# Patient Record
Sex: FEMALE | Race: WHITE | NOT HISPANIC OR LATINO | Employment: FULL TIME | URBAN - METROPOLITAN AREA
[De-identification: names, ages, dates, MRNs, and addresses within clinical notes are randomized per-mention and may not be internally consistent; named-entity substitution may affect disease eponyms.]

---

## 2017-02-16 ENCOUNTER — OFFICE VISIT (OUTPATIENT)
Dept: FAMILY MEDICINE | Facility: CLINIC | Age: 57
End: 2017-02-16
Payer: COMMERCIAL

## 2017-02-16 ENCOUNTER — TELEPHONE (OUTPATIENT)
Dept: NURSING | Facility: CLINIC | Age: 57
End: 2017-02-16

## 2017-02-16 VITALS
HEART RATE: 74 BPM | TEMPERATURE: 98 F | HEIGHT: 69 IN | WEIGHT: 140 LBS | DIASTOLIC BLOOD PRESSURE: 81 MMHG | BODY MASS INDEX: 20.73 KG/M2 | RESPIRATION RATE: 18 BRPM | SYSTOLIC BLOOD PRESSURE: 112 MMHG | OXYGEN SATURATION: 97 %

## 2017-02-16 DIAGNOSIS — J01.90 ACUTE SINUSITIS WITH SYMPTOMS > 10 DAYS: Primary | ICD-10-CM

## 2017-02-16 DIAGNOSIS — B37.31 CANDIDIASIS OF VULVA AND VAGINA: ICD-10-CM

## 2017-02-16 DIAGNOSIS — H10.32 ACUTE CONJUNCTIVITIS OF LEFT EYE: Primary | ICD-10-CM

## 2017-02-16 DIAGNOSIS — H10.32 ACUTE CONJUNCTIVITIS OF LEFT EYE: ICD-10-CM

## 2017-02-16 PROCEDURE — 99214 OFFICE O/P EST MOD 30 MIN: CPT | Performed by: FAMILY MEDICINE

## 2017-02-16 RX ORDER — FLUCONAZOLE 150 MG/1
150 TABLET ORAL
Qty: 4 TABLET | Refills: 0 | Status: SHIPPED | OUTPATIENT
Start: 2017-02-16 | End: 2022-12-19

## 2017-02-16 RX ORDER — POLYMYXIN B SULFATE AND TRIMETHOPRIM 1; 10000 MG/ML; [USP'U]/ML
1 SOLUTION OPHTHALMIC 4 TIMES DAILY
Qty: 1 BOTTLE | Refills: 0 | Status: SHIPPED | OUTPATIENT
Start: 2017-02-16 | End: 2022-12-19

## 2017-02-16 RX ORDER — POLYMYXIN B SULFATE AND TRIMETHOPRIM 1; 10000 MG/ML; [USP'U]/ML
1 SOLUTION OPHTHALMIC EVERY 4 HOURS
Qty: 1 BOTTLE | Refills: 0 | Status: SHIPPED | OUTPATIENT
Start: 2017-02-16 | End: 2017-02-16

## 2017-02-16 NOTE — PATIENT INSTRUCTIONS
Use Afrin/nasal decongestant twice a day for 3 days.      Flonase or nasacort can help as well - this you could continue for 7-14 days    Netti pot or nasal saline rinse                 Sinusitis          What is sinusitis?   Sinusitis is swollen, infected linings of the sinuses. The sinuses are hollow spaces in the bones of your face and skull. They connect with the nose through small openings. Like the nose, their linings make mucus.   How does it occur?   Sinusitis occurs when the sinus linings become infected. The passageways from the sinuses to the nose are very narrow. Swelling and mucus may block the passageways. This leads to pressure changes in the sinuses that can be painful.   A number of things can cause swelling and sinusitis. Most often it's allergens (things that cause allergies, like pollen and mold) and viruses, such as viruses that cause the common cold. Whether the cause is allergies or a virus, the sinus linings can swell. When swelling causes the sinus passageway to swell shut, bacteria, viruses, and even fungus can be trapped in the sinuses and cause a sinus infection.   If your nasal bones have been injured or are deformed, causing partial blockage of the sinus openings, you are more likely to get sinusitis.   What are the symptoms?   Symptoms include:   feeling of fullness or pressure in your head   a headache that is most painful when you first wake up in the morning or when you bend your head down or forward   pain above or below your eyes   aching in the upper jaw and teeth   runny or stuffy nose   cough, especially at night   fluid draining down the back of your throat (postnasal drainage)   sore throat in the morning or evening.   How is it diagnosed?   Your healthcare provider will ask about your symptoms and will examine you. You may have an X-ray to look for swelling, fluid, or small benign growths (polyps) in the sinuses.   How is it treated?   Decongestants may help. They may be  nonprescription or prescription. They are available as liquids, pills, and nose sprays.   Your healthcare provider may prescribe an antibiotic. In some cases you may need to take decongestants and antibiotics for several weeks.   You may need nonprescription medicine for pain, such as acetaminophen or ibuprofen. Check with your healthcare provider before you give any medicine that contains aspirin or salicylates to a child or teen. This includes medicines like baby aspirin, some cold medicines, and Pepto Bismol. Children and teens who take aspirin are at risk for a serious illness called Reye's syndrome. Ibuprofen is an NSAID. Nonsteroidal anti-inflammatory medicines (NSAIDs) may cause stomach bleeding and other problems. These risks increase with age. Read the label and take as directed. Unless recommended by your healthcare provider, do not take NSAIDs for more than 10 days for any reason.   If you have chronic or repeated sinus infections, allergies may be the cause. Your healthcare provider may prescribe antihistamine tablets or prescription nasal sprays (steroids or cromolyn) to treat the allergies.   If you have chronic, severe sinusitis that does not respond to treatment with medicines, surgery may be done. The surgeon can create an extra or enlarged passageway in the wall of the sinus cavity. This allows the sinuses to drain more easily through the nasal passages. This should help them stay free of infection.   How long will the effects last?   Symptoms may get better gradually over 3 to 10 days. Depending on what caused the sinusitis and how severe it is, it may last for days or weeks. The symptoms may come back if you do not finish all of your antibiotic.   How can I take care of myself?   Follow your healthcare provider's instructions.   If you are taking an antibiotic, take all of it as directed by your provider. If you stop taking the medicine when your symptoms are gone but before you have taken all of  the medicine, symptoms may come back.   Avoid tobacco smoke.   If you have allergies, take care to avoid the things you are allergic to, such as animal dander.   Add moisture to the air with a humidifier or a vaporizer, unless you have mold allergy (mold may grow in your vaporizer).   Inhale steam from a basin of hot water or shower to help open your sinuses and relieve pain.   Use saline nasal sprays to help wash out nasal passages and clear some mucus from the airways.   Use decongestants as directed on the label or by your provider.   If you are using a nonprescription nasal-spray decongestant, generally you should not use it for more than 3 days. After 3 days it may cause your symptoms to get worse. Ask your healthcare provider if it is OK for you to use a nasal spray decongestant longer than this.   Get plenty of rest.   Drink more fluids to keep the mucus as thin as possible so your sinuses can drain more easily.   Put warm compresses on painful areas.   Take antibiotics as prescribed. Use all of the medicine, even after you feel better. Some sinus infections require 2 to 4 weeks of antibiotic treatment.   See your healthcare provider if the pain lasts for several days or gets worse.   If the sinus areas above or below your eyes are swollen or bulging, see your healthcare provider right away. This symptom may mean that the infection is spreading. A spreading infection can affect other parts of your body--even the brain--and needs to be treated promptly.   How can I help prevent sinusitis?   Treat your colds and allergies promptly. Use decongestants as soon as you start having symptoms.   Do not smoke and stay away from secondhand smoke.   Drink lots of fluids to keep the mucus thin.   Humidify your home if the air is particularly dry.   If you have sinus infections often, consider having allergy tests.   If sinusitis continues to be a problem despite treatment, you might need an exam by an ear, nose, and  throat doctor (called an ENT or otolaryngologist). The specialist will check for polyps or a deformed bone that may be blocking your sinuses.     Published by [employer].  This content is reviewed periodically and is subject to change as new health information becomes available. The information is intended to inform and educate and is not a replacement for medical evaluation, advice, diagnosis or treatment by a healthcare professional.   Developed by [employer].   ? 2010 [employer] and/or its affiliates. All Rights Reserved.   Copyright   Clinical Reference Systems 2011  Adult Health Advisor          Thank you for choosing [facility].  You may be receiving a survey in the mail from [employer] regarding your visit today.  Please take a few minutes to complete and return the survey to let us know how we are doing.      Our Clinic hours are:  Mondays    7:20 am - 7 pm  Tues -  Fri  7:20 am - 5 pm    Clinic Phone: [phone]    The clinic lab opens at 7:30 am Mon - Fri and appointments are required.    [facility]  Ph. [phone]  Monday-Thursday 8 am - 7pm  Tues/Wed/Fri 8 am - 5:30 pm

## 2017-02-16 NOTE — NURSING NOTE
"Chief Complaint   Patient presents with     Ent Problem     Health Maintenance     agreed to mammogram, fit and pap       Initial /81 (BP Location: Right arm, Patient Position: Chair, Cuff Size: Adult Regular)  Pulse 74  Temp 98  F (36.7  C) (Tympanic)  Resp 18  Ht 5' 9\" (1.753 m)  Wt 140 lb (63.5 kg)  SpO2 97%  Breastfeeding? No  BMI 20.67 kg/m2 Estimated body mass index is 20.67 kg/(m^2) as calculated from the following:    Height as of this encounter: 5' 9\" (1.753 m).    Weight as of this encounter: 140 lb (63.5 kg).  Medication Reconciliation: complete    "

## 2017-02-16 NOTE — PROGRESS NOTES
"  SUBJECTIVE:                                                    Thais Castellon is a 56 year old female who presents to clinic today for the following health issues:      Acute Illness   Acute illness concerns: blood shot eyes, sinus  Onset: 3 weeks    Fever: no     Chills/Sweats: YES    Headache (location?): YES    Sinus Pressure:YES- tender, post-nasal drainage and facial pain    Conjunctivitis:  YES: left    Ear Pain: YES: right    Rhinorrhea: YES    Congestion: YES    Sore Throat: YES     Cough: YES-greenish/brown    Wheeze: YES    Decreased Appetite: YES    Nausea: no     Vomiting: no     Diarrhea:  no     Dysuria/Freq.: no     Fatigue/Achiness: YES    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 No  BMI 20.67 kg/m2  EXAM: GENERAL APPEARANCE: Alert, no acute distress  HENT: right TM abnormal, dull, yellow fluid behind TM, bulging, left TM normal, oral mucous membranes moist, frontal sinus tenderness bilateral, nasal membranes injected/edematous  RESP: lungs clear to auscultation   CV: normal rate, regular rhythm, no murmur or gallop  ABDOMEN: soft, no organomegaly, masses or tenderness      ASSESSMENT/PLAN:      ICD-10-CM    1. Acute sinusitis with symptoms > 10 days J01.90 amoxicillin-clavulanate (AUGMENTIN) 875-125 MG per tablet   2. Candidiasis of vulva and vagina B37.3 fluconazole (DIFLUCAN) 150 MG tablet   will treat with antibiotic given length of symptoms (2 weeks)    Patient Instructions   Use Afrin/nasal decongestant twice a day for 3 days.      Flonase or nasacort can help as well - this you could continue for 7-14 days    Netti pot or nasal saline rinse                 Sinusitis          What is sinusitis? "   Sinusitis is swollen, infected linings of the sinuses. The sinuses are hollow spaces in the bones of your face and skull. They connect with the nose through small openings. Like the nose, their linings make mucus.   How does it occur?   Sinusitis occurs when the sinus linings become infected. The passageways from the sinuses to the nose are very narrow. Swelling and mucus may block the passageways. This leads to pressure changes in the sinuses that can be painful.   A number of things can cause swelling and sinusitis. Most often it's allergens (things that cause allergies, like pollen and mold) and viruses, such as viruses that cause the common cold. Whether the cause is allergies or a virus, the sinus linings can swell. When swelling causes the sinus passageway to swell shut, bacteria, viruses, and even fungus can be trapped in the sinuses and cause a sinus infection.   If your nasal bones have been injured or are deformed, causing partial blockage of the sinus openings, you are more likely to get sinusitis.   What are the symptoms?   Symptoms include:   feeling of fullness or pressure in your head   a headache that is most painful when you first wake up in the morning or when you bend your head down or forward   pain above or below your eyes   aching in the upper jaw and teeth   runny or stuffy nose   cough, especially at night   fluid draining down the back of your throat (postnasal drainage)   sore throat in the morning or evening.   How is it diagnosed?   Your healthcare provider will ask about your symptoms and will examine you. You may have an X-ray to look for swelling, fluid, or small benign growths (polyps) in the sinuses.   How is it treated?   Decongestants may help. They may be nonprescription or prescription. They are available as liquids, pills, and nose sprays.   Your healthcare provider may prescribe an antibiotic. In some cases you may need to take decongestants and antibiotics for several weeks.    You may need nonprescription medicine for pain, such as acetaminophen or ibuprofen. Check with your healthcare provider before you give any medicine that contains aspirin or salicylates to a child or teen. This includes medicines like baby aspirin, some cold medicines, and Pepto Bismol. Children and teens who take aspirin are at risk for a serious illness called Reye's syndrome. Ibuprofen is an NSAID. Nonsteroidal anti-inflammatory medicines (NSAIDs) may cause stomach bleeding and other problems. These risks increase with age. Read the label and take as directed. Unless recommended by your healthcare provider, do not take NSAIDs for more than 10 days for any reason.   If you have chronic or repeated sinus infections, allergies may be the cause. Your healthcare provider may prescribe antihistamine tablets or prescription nasal sprays (steroids or cromolyn) to treat the allergies.   If you have chronic, severe sinusitis that does not respond to treatment with medicines, surgery may be done. The surgeon can create an extra or enlarged passageway in the wall of the sinus cavity. This allows the sinuses to drain more easily through the nasal passages. This should help them stay free of infection.   How long will the effects last?   Symptoms may get better gradually over 3 to 10 days. Depending on what caused the sinusitis and how severe it is, it may last for days or weeks. The symptoms may come back if you do not finish all of your antibiotic.   How can I take care of myself?   Follow your healthcare provider's instructions.   If you are taking an antibiotic, take all of it as directed by your provider. If you stop taking the medicine when your symptoms are gone but before you have taken all of the medicine, symptoms may come back.   Avoid tobacco smoke.   If you have allergies, take care to avoid the things you are allergic to, such as animal dander.   Add moisture to the air with a humidifier or a vaporizer, unless  you have mold allergy (mold may grow in your vaporizer).   Inhale steam from a basin of hot water or shower to help open your sinuses and relieve pain.   Use saline nasal sprays to help wash out nasal passages and clear some mucus from the airways.   Use decongestants as directed on the label or by your provider.   If you are using a nonprescription nasal-spray decongestant, generally you should not use it for more than 3 days. After 3 days it may cause your symptoms to get worse. Ask your healthcare provider if it is OK for you to use a nasal spray decongestant longer than this.   Get plenty of rest.   Drink more fluids to keep the mucus as thin as possible so your sinuses can drain more easily.   Put warm compresses on painful areas.   Take antibiotics as prescribed. Use all of the medicine, even after you feel better. Some sinus infections require 2 to 4 weeks of antibiotic treatment.   See your healthcare provider if the pain lasts for several days or gets worse.   If the sinus areas above or below your eyes are swollen or bulging, see your healthcare provider right away. This symptom may mean that the infection is spreading. A spreading infection can affect other parts of your body--even the brain--and needs to be treated promptly.   How can I help prevent sinusitis?   Treat your colds and allergies promptly. Use decongestants as soon as you start having symptoms.   Do not smoke and stay away from secondhand smoke.   Drink lots of fluids to keep the mucus thin.   Humidify your home if the air is particularly dry.   If you have sinus infections often, consider having allergy tests.   If sinusitis continues to be a problem despite treatment, you might need an exam by an ear, nose, and throat doctor (called an ENT or otolaryngologist). The specialist will check for polyps or a deformed bone that may be blocking your sinuses.     Published by [employer].  This content is reviewed periodically and is subject to  change as new health information becomes available. The information is intended to inform and educate and is not a replacement for medical evaluation, advice, diagnosis or treatment by a healthcare professional.   Developed by [employer].   ? 2010 [employer] and/or its affiliates. All Rights Reserved.   Copyright   Clinical Reference Systems 2011  Adult Health Advisor          Thank you for choosing [facility].  You may be receiving a survey in the mail from [employer] regarding your visit today.  Please take a few minutes to complete and return the survey to let us know how we are doing.      Our Clinic hours are:  Mondays    7:20 am - 7 pm  Tues -  Fri  7:20 am - 5 pm    Clinic Phone: [phone]    The clinic lab opens at 7:30 am Mon - Fri and appointments are required.    [facility]. [phone]  Monday-Thursday 8 am - 7pm  Tues/Wed/Fri 8 am - 5:30 pm

## 2017-02-16 NOTE — TELEPHONE ENCOUNTER
Transmission to pharmacy failing x 2 during e prescribing.  Verbal order provided to pharmacist at [facility] in [place], [place] at 5:50pm.    Rhonda Soliz RN  FNA

## 2017-02-16 NOTE — MR AVS SNAPSHOT
After Visit Summary   2/16/2017    Thais Castellon    MRN: [id number]           Patient Information     Date Of Birth          1960        Visit Information        Provider Department      2/16/2017 8:40 AM Karrie Infante MD [facility]        Today's Diagnoses     Acute sinusitis with symptoms > 10 days    -  1    Candidiasis of vulva and vagina          Care Instructions    Use Afrin/nasal decongestant twice a day for 3 days.      Flonase or nasacort can help as well - this you could continue for 7-14 days    Netti pot or nasal saline rinse                 Sinusitis          What is sinusitis?   Sinusitis is swollen, infected linings of the sinuses. The sinuses are hollow spaces in the bones of your face and skull. They connect with the nose through small openings. Like the nose, their linings make mucus.   How does it occur?   Sinusitis occurs when the sinus linings become infected. The passageways from the sinuses to the nose are very narrow. Swelling and mucus may block the passageways. This leads to pressure changes in the sinuses that can be painful.   A number of things can cause swelling and sinusitis. Most often it's allergens (things that cause allergies, like pollen and mold) and viruses, such as viruses that cause the common cold. Whether the cause is allergies or a virus, the sinus linings can swell. When swelling causes the sinus passageway to swell shut, bacteria, viruses, and even fungus can be trapped in the sinuses and cause a sinus infection.   If your nasal bones have been injured or are deformed, causing partial blockage of the sinus openings, you are more likely to get sinusitis.   What are the symptoms?   Symptoms include:   feeling of fullness or pressure in your head   a headache that is most painful when you first wake up in the morning or when you bend your head down or forward   pain above or below your eyes   aching in the upper jaw and teeth   runny  or stuffy nose   cough, especially at night   fluid draining down the back of your throat (postnasal drainage)   sore throat in the morning or evening.   How is it diagnosed?   Your healthcare provider will ask about your symptoms and will examine you. You may have an X-ray to look for swelling, fluid, or small benign growths (polyps) in the sinuses.   How is it treated?   Decongestants may help. They may be nonprescription or prescription. They are available as liquids, pills, and nose sprays.   Your healthcare provider may prescribe an antibiotic. In some cases you may need to take decongestants and antibiotics for several weeks.   You may need nonprescription medicine for pain, such as acetaminophen or ibuprofen. Check with your healthcare provider before you give any medicine that contains aspirin or salicylates to a child or teen. This includes medicines like baby aspirin, some cold medicines, and Pepto Bismol. Children and teens who take aspirin are at risk for a serious illness called Reye's syndrome. Ibuprofen is an NSAID. Nonsteroidal anti-inflammatory medicines (NSAIDs) may cause stomach bleeding and other problems. These risks increase with age. Read the label and take as directed. Unless recommended by your healthcare provider, do not take NSAIDs for more than 10 days for any reason.   If you have chronic or repeated sinus infections, allergies may be the cause. Your healthcare provider may prescribe antihistamine tablets or prescription nasal sprays (steroids or cromolyn) to treat the allergies.   If you have chronic, severe sinusitis that does not respond to treatment with medicines, surgery may be done. The surgeon can create an extra or enlarged passageway in the wall of the sinus cavity. This allows the sinuses to drain more easily through the nasal passages. This should help them stay free of infection.   How long will the effects last?   Symptoms may get better gradually over 3 to 10 days.  Depending on what caused the sinusitis and how severe it is, it may last for days or weeks. The symptoms may come back if you do not finish all of your antibiotic.   How can I take care of myself?   Follow your healthcare provider's instructions.   If you are taking an antibiotic, take all of it as directed by your provider. If you stop taking the medicine when your symptoms are gone but before you have taken all of the medicine, symptoms may come back.   Avoid tobacco smoke.   If you have allergies, take care to avoid the things you are allergic to, such as animal dander.   Add moisture to the air with a humidifier or a vaporizer, unless you have mold allergy (mold may grow in your vaporizer).   Inhale steam from a basin of hot water or shower to help open your sinuses and relieve pain.   Use saline nasal sprays to help wash out nasal passages and clear some mucus from the airways.   Use decongestants as directed on the label or by your provider.   If you are using a nonprescription nasal-spray decongestant, generally you should not use it for more than 3 days. After 3 days it may cause your symptoms to get worse. Ask your healthcare provider if it is OK for you to use a nasal spray decongestant longer than this.   Get plenty of rest.   Drink more fluids to keep the mucus as thin as possible so your sinuses can drain more easily.   Put warm compresses on painful areas.   Take antibiotics as prescribed. Use all of the medicine, even after you feel better. Some sinus infections require 2 to 4 weeks of antibiotic treatment.   See your healthcare provider if the pain lasts for several days or gets worse.   If the sinus areas above or below your eyes are swollen or bulging, see your healthcare provider right away. This symptom may mean that the infection is spreading. A spreading infection can affect other parts of your body--even the brain--and needs to be treated promptly.   How can I help prevent sinusitis?   Treat  your colds and allergies promptly. Use decongestants as soon as you start having symptoms.   Do not smoke and stay away from secondhand smoke.   Drink lots of fluids to keep the mucus thin.   Humidify your home if the air is particularly dry.   If you have sinus infections often, consider having allergy tests.   If sinusitis continues to be a problem despite treatment, you might need an exam by an ear, nose, and throat doctor (called an ENT or otolaryngologist). The specialist will check for polyps or a deformed bone that may be blocking your sinuses.     Published by [employer].  This content is reviewed periodically and is subject to change as new health information becomes available. The information is intended to inform and educate and is not a replacement for medical evaluation, advice, diagnosis or treatment by a healthcare professional.   Developed by [employer].   ? 2010 [employer] and/or its affiliates. All Rights Reserved.   Copyright   [employer] 2011  Adult Health Advisor          Thank you for choosing [facility].  You may be receiving a survey in the mail from [email or web address] regarding your visit today.  Please take a few minutes to complete and return the survey to let us know how we are doing.      Our Clinic hours are:  Mondays    7:20 am - 7 pm  Tues -  Fri  7:20 am - 5 pm    Clinic Phone: [phone]    The clinic lab opens at 7:30 am Mon - Fri and appointments are required.    [facility]  Ph. [phone]  Monday-Thursday 8 am - 7pm  Tues/Wed/Fri 8 am - 5:30 pm               Follow-ups after your visit        Who to contact     If you have questions or need follow up information about today's clinic visit or your schedule please contact [facility] directly at [phone].  Normal or non-critical lab and imaging results will be communicated to you by MyChart, letter or phone within 4 business days after the clinic has received the  "results. If you do not hear from us within 7 days, please contact the clinic through [employer] or phone. If you have a critical or abnormal lab result, we will notify you by phone as soon as possible.  Submit refill requests through [employer] or call your pharmacy and they will forward the refill request to us. Please allow 3 business days for your refill to be completed.          Additional Information About Your Visit        [employer] Information     [employer] lets you send messages to your doctor, view your test results, renew your prescriptions, schedule appointments and more. To sign up, go to [email or web address] . Click on \"Log in\" on the left side of the screen, which will take you to the Welcome page. Then click on \"Sign up Now\" on the right side of the page.     You will be asked to enter the access code listed below, as well as some personal information. Please follow the directions to create your username and password.     Your access code is: [email or web address]  Expires: 3/16/2017  8:31 AM     Your access code will  in 90 days. If you need help or a new code, please call your [place] clinic or [phone].        Care EveryWhere ID     This is your Care EveryWhere ID. This could be used by other organizations to access your [place] medical records  [id number]        Your Vitals Were     Pulse Temperature Respirations Height Pulse Oximetry Breastfeeding?    74 98  F (36.7  C) (Tympanic) 18 5' 9\" (1.753 m) 97% No    BMI (Body Mass Index)                   20.67 kg/m2            Blood Pressure from Last 3 Encounters:   17 112/81   16 139/89   16 112/80    Weight from Last 3 Encounters:   17 140 lb (63.5 kg)   16 144 lb (65.3 kg)   16 138 lb (62.6 kg)              Today, you had the following     No orders found for display         Today's Medication Changes          These changes are accurate as of: 17  9:00 AM.  If you have any questions, ask your nurse or doctor. "               Start taking these medicines.        Dose/Directions    amoxicillin-clavulanate 875-125 MG per tablet   Commonly known as:  AUGMENTIN   Used for:  Acute sinusitis with symptoms > 10 days   Started by:  Karrie Infante MD        Dose:  1 tablet   Take 1 tablet by mouth 2 times daily   Quantity:  28 tablet   Refills:  0       fluconazole 150 MG tablet   Commonly known as:  DIFLUCAN   Used for:  Candidiasis of vulva and vagina   Started by:  Karrie Infante MD        Dose:  150 mg   Take 1 tablet (150 mg) by mouth every 3 days   Quantity:  4 tablet   Refills:  0         Stop taking these medicines if you haven't already. Please contact your care team if you have questions.     azithromycin 250 MG tablet   Commonly known as:  ZITHROMAX   Stopped by:  Karrie Infante MD                Where to get your medicines      These medications were sent to [facility] – [place], [place] - [address]  [address] [facility] [address]     Phone:  [phone]     amoxicillin-clavulanate 875-125 MG per tablet    fluconazole 150 MG tablet                Primary Care Provider Office Phone # Fax #    Nupur Montalvo [phone]       [address] [address]        Thank you!     Thank you for choosing [facility]  for your care. Our goal is always to provide you with excellent care. Hearing back from our patients is one way we can continue to improve our services. Please take a few minutes to complete the written survey that you may receive in the mail after your visit with us. Thank you!             Your Updated Medication List - Protect others around you: Learn how to safely use, store and throw away your medicines at www.disposemymeds.org.          This list is accurate as of: 2/16/17  9:00 AM.  Always use your most recent med list.                   Brand Name Dispense Instructions for use     amoxicillin-clavulanate 875-125 MG per tablet    AUGMENTIN    28 tablet    Take 1 tablet by mouth 2 times daily       Desoximetasone 0.05 % Crea     100 g    Apply to affected area once or twice daily for 1-2 weeks, then only apply as needed       fluconazole 150 MG tablet    DIFLUCAN    4 tablet    Take 1 tablet (150 mg) by mouth every 3 days       Multi-vitamin Tabs tablet   Generic drug:  multivitamin, therapeutic with minerals      Take 1 tablet by mouth daily.

## 2017-02-16 NOTE — TELEPHONE ENCOUNTER
RN Conjunctivitis Protocol: Ages 2 and older  Thais Castellon is a 56 year old female is having symptoms reviewed for possible conjunctivitis.      ASSESSMENT/PLAN:  Allergy to Sulfa?  No   1.  Medication Indicated: YES - POLYTRIM 22436-9.1 UNIT/ML-% OP Sol, 1 drop in affected eye(s) 4 times daily while awake x 7 days. .    2.  Education regarding contact precautions, hand washing, avoid wearing contacts until finished with drops or until symptoms resolve, contact clinic if there is no improvement of symptoms within 3 days and if develops eye pain or sensitivity to light.   3.  Follow-up: Contact provider's triage RN if symptoms do not improve after 3 days of antibiotic treatment or if symptoms return after antibiotic therapy is complete.  4.  Patient verbalized understanding of this plan and is agreeable.    SUBJECTIVE:     Conjunctival symptoms: redness, mattering (yellow/green), burning and itching   Location: left eye  Onset: 1 day ago  In addition notes: None  Contact Lens use?: No  Complicating factors    Reports:Eyelid symptoms swelling   Denies:NONE     OBJECTIVE:     Encounter handled by: Nurse Triage.     Sarah Dean RN

## 2017-03-16 ENCOUNTER — TELEPHONE (OUTPATIENT)
Dept: FAMILY MEDICINE | Facility: CLINIC | Age: 57
End: 2017-03-16

## 2017-03-16 NOTE — TELEPHONE ENCOUNTER
Panel Management Review      Patient has the following on her problem list: None      Composite cancer screening  Chart review shows that this patient is due/due soon for the following Mammogram and Fecal Colorectal (FIT)  Summary:    Patient is due/failing the following:   FIT and MAMMOGRAM    Action needed:   Patient needs referral/order: fit and mammogram    Type of outreach:    Phone, spoke to patient.  left detailed message on personal cell re: Hm screening [facility]    Questions for provider review:    None                                                                                                                                    Eva Cordon MA       Chart routed to Care Team .

## 2017-04-12 ENCOUNTER — TELEPHONE (OUTPATIENT)
Dept: FAMILY MEDICINE | Facility: CLINIC | Age: 57
End: 2017-04-12

## 2017-05-07 PROCEDURE — 82274 ASSAY TEST FOR BLOOD FECAL: CPT | Performed by: NURSE PRACTITIONER

## 2017-05-10 DIAGNOSIS — Z12.11 COLON CANCER SCREENING: ICD-10-CM

## 2017-05-11 LAB — HEMOCCULT STL QL IA: NEGATIVE

## 2017-05-18 ENCOUNTER — TELEPHONE (OUTPATIENT)
Dept: FAMILY MEDICINE | Facility: CLINIC | Age: 57
End: 2017-05-18

## 2017-05-18 NOTE — TELEPHONE ENCOUNTER
Panel Management Review      Patient has the following on her problem list: None      Composite cancer screening  Chart review shows that this patient is due/due soon for the following Mammogram  Summary:    Patient is due/failing the following:   MAMMOGRAM    Action needed:   Pt to schedule mammogram    Type of outreach:    Sent letter.    Questions for provider review:    None                                                                                                                                    Reema Infante MA

## 2017-05-18 NOTE — LETTER
[facility]  [address]  [facility] [address]  Phone: [phone]    May 18, 2017    Thais Castellon  [address] [facility] [address]              Dear Ms. Castellon,       This is a reminder that it is time to make your appointment for your annual mammogram.  You may make an appointment for your Digital Mammogram by calling our scheduling line at [phone] to schedule at one of our locations.    If you are experiencing breast symptoms or concerns such as a new lump or breast pain you should first contact your health care provider before scheduling your mammogram. Your physician may want to see you prior to your visit.    We would like to take this opportunity to remind you that along with an annual mammogram, the American Cancer Society recommends an annual breast examination by your physician or health care provider.    If you are a patient currently enrolled in the [place] [facility]OL Program or the [facility] Program, you must be seen by your provider for a clinical breast examination prior to your mammogram.    Please disregard this notice if you have already scheduled an appointment. Or, if you have had this done elsewhere, please have your records sent to the clinic.     Thank you and we look forward to seeing you in the near future for your annual screening mammogram.          Sincerely,      Karrie Infante MD/ la

## 2017-06-26 ENCOUNTER — HOSPITAL ENCOUNTER (OUTPATIENT)
Dept: MAMMOGRAPHY | Facility: CLINIC | Age: 57
Discharge: HOME | End: 2017-06-26
Attending: NURSE PRACTITIONER | Admitting: NURSE PRACTITIONER
Payer: COMMERCIAL

## 2017-06-26 DIAGNOSIS — Z12.31 ENCOUNTER FOR SCREENING MAMMOGRAM FOR BREAST CANCER: ICD-10-CM

## 2017-06-26 PROCEDURE — G0202 SCR MAMMO BI INCL CAD: HCPCS

## 2017-06-26 PROCEDURE — 77063 BREAST TOMOSYNTHESIS BI: CPT

## 2018-01-08 ENCOUNTER — HOSPITAL ENCOUNTER (OUTPATIENT)
Dept: MRI IMAGING | Facility: CLINIC | Age: 58
Discharge: HOME | End: 2018-01-08
Attending: PHYSICIAN ASSISTANT | Admitting: PHYSICIAN ASSISTANT
Payer: COMMERCIAL

## 2018-01-08 DIAGNOSIS — S99.922S FOOT INJURY, LEFT, SEQUELA: ICD-10-CM

## 2018-01-08 PROCEDURE — 73718 MRI LOWER EXTREMITY W/O DYE: CPT | Mod: LT

## 2018-01-11 ENCOUNTER — TRANSFERRED RECORDS (OUTPATIENT)
Dept: HEALTH INFORMATION MANAGEMENT | Facility: CLINIC | Age: 58
End: 2018-01-11

## 2018-05-07 ENCOUNTER — TELEPHONE (OUTPATIENT)
Dept: FAMILY MEDICINE | Facility: CLINIC | Age: 58
End: 2018-05-07

## 2018-05-07 DIAGNOSIS — B37.31 YEAST INFECTION OF THE VAGINA: Primary | ICD-10-CM

## 2018-05-07 RX ORDER — FLUCONAZOLE 150 MG/1
150 TABLET ORAL ONCE
Qty: 1 TABLET | Refills: 0 | Status: SHIPPED | OUTPATIENT
Start: 2018-05-07 | End: 2018-05-07

## 2018-05-07 NOTE — TELEPHONE ENCOUNTER
Reason for call:  Patient reporting a symptom    Symptom or request: Pt is calling to ask Dr. Infante for an Rx for a vaginal yeast infection med.  She recently had oral surgery on an infected tooth and was prescribed Amox, by the dentist, for 10 days and now has a yeast infection.  [facility]    Duration (how long have symptoms been present): today    Have you been treated for this before? Yes    Additional comments:     Phone Number patient can be reached at:  Cell number on file:    Telephone Information:   Mobile [phone]       Best Time:  any    Can we leave a detailed message on this number:  YES    Call taken on 5/7/2018 at 9:11 AM by Viridiana Monreal

## 2018-10-24 ENCOUNTER — TRANSFERRED RECORDS (OUTPATIENT)
Dept: HEALTH INFORMATION MANAGEMENT | Facility: CLINIC | Age: 58
End: 2018-10-24

## 2019-02-04 ENCOUNTER — VIRTUAL VISIT (OUTPATIENT)
Dept: FAMILY MEDICINE | Facility: OTHER | Age: 59
End: 2019-02-04

## 2019-02-04 NOTE — PROGRESS NOTES
"Date:   Clinician: Joseph Juarez  Clinician NPI: [id number]  Patient: Thais Castellon  Patient : 1960  Patient Address: [address] [id number]  Patient Phone: [phone]  Visit Protocol: URI  Patient Summary:  Thais is a 58 year old ( : 1960 ) female who initiated a Visit for cold, sinus infection, or influenza. When asked the question \"Please sign me up to receive news, health information and promotions from [employer].\", Thais responded \"No\".    Thais states her symptoms started suddenly 2-3 weeks ago.   Her symptoms consist of myalgia, enlarged lymph nodes, a headache, a sore throat, malaise, facial pain or pressure, a cough, chills, rhinitis, and nasal congestion. She is experiencing difficulty breathing due to nasal congestion but she is not short of breath.   Symptom details     Nasal secretions: The color of her mucus is yellow and blood-tinged.    Cough: Thais coughs a few times an hour and her cough is more bothersome at night. Phlegm comes into her throat when she coughs. She believes the phlegm causes the cough. The color of the phlegm is yellow and green.     Sore throat: Thais reports having moderate throat pain (4-6 on a 10 point pain scale), does not have exudate on her tonsils, and can swallow liquids. The lymph nodes in her neck are enlarged. A rash has not appeared on the skin since the sore throat started.     Facial pain or pressure: The facial pain or pressure feels worse when bending over or leaning forward.     Headache: She states the headache is mild (1-3 on a 10 point pain scale).      Thais denies having ear pain, fever, wheezing, and teeth pain. She also denies taking antibiotic medication for the symptoms, having recent facial or sinus surgery in the past 60 days, and double sickening (worsening symptoms after initial improvement).   Thais is not sure if she has been exposed to someone with strep throat. She has not recently been exposed to someone " with influenza. Thais has been in close contact with the following high risk individuals: adults 65 or older and people with asthma, heart disease or diabetes.   Thais had 1 sinus infection within the past year.   Weight: 140 lbs   Thais does not smoke or use smokeless tobacco.   MEDICATIONS: No current medications, ALLERGIES: NKDA  Clinician Response:  Dear Thais,  Based on the information provided, you have acute bacterial sinusitis, also known as a sinus infection. Sinus infections are caused by bacteria or a virus and symptoms are almost always identical. The difference between the 2 types of infections is timing.  Sinus infections start as viral infections and symptoms improve on their own in about 7 days. If symptoms have not improved after 7 days or have even worsened, a bacterial infection may have developed.  Medication information  I am prescribing:       Fluticasone 50 mcg/actuation nasal spray. Inhale 2 sprays in each nostril 1 time per day; after 1 week, may adjust to 1 - 2 sprays in each nostril 1 time per day. This medication takes several days to start working, so keep taking it even if it doesn't help right away. There are no refills with this prescription.      Amoxicillin 500 mg oral tablet. Take 1 tablet by mouth every 8 hours for 10 days. There are no refills with this prescription.     Antibiotics can cause an allergic reaction even if you have taken them without a problem in the past. If you develop a new rash, swelling, or difficulty breathing, stop the medication and be seen in a clinic or urgent care immediately.  A yeast infection is a side effect of taking antibiotics in some women. Please use [facility] to get treatment if you have symptoms of a yeast infection.  Self care  The following tips will keep you as comfortable as possible while you recover:     Rest    Drink plenty of water and other liquids    Take a hot shower to loosen congestion    Use throat lozenges    Gargle with warm salt  water (1/4 teaspoon of salt per 8 ounce glass of water)    Suck on frozen items such as popsicles or ice cubes    Drink hot tea with lemon and honey    Take a spoonful of honey to reduce your cough     When to seek care  Please be seen in a clinic or urgent care if any of the following occur:     Symptoms do not start to improve after 3 days of treatment    New symptoms develop, or symptoms become worse     Call 911 or go to the emergency room if you feel that your throat is closing off, you suddenly develop a rash, you are unable to swallow fluids, you are drooling, or you are having difficulty breathing.   Diagnosis: Acute bacterial sinusitis  Diagnosis ICD: J01.90  Prescription: amoxicillin 500 mg oral tablet 30 tablet, 10 days supply. Take 1 tablet by mouth every 8 hours for 10 days. Refills: 0, Refill as needed: no, Allow substitutions: yes  Prescription: fluticasone 50 mcg/actuation nasal spray,suspension 1 120 spray aerosol with adapter (grams), 30 days supply. Inhale 2 sprays in each nostril 1 time per day; after 1 week, may adjust to 1 - 2 sprays in each nostril 1 time per day.. Refills: 0, Refill as needed: no, Allow substitutions: yes  Pharmacy: [facility] - [phone] - [address] [place] [address]

## 2019-02-11 ENCOUNTER — VIRTUAL VISIT (OUTPATIENT)
Dept: FAMILY MEDICINE | Facility: OTHER | Age: 59
End: 2019-02-11

## 2019-02-11 NOTE — PROGRESS NOTES
"Date:   Clinician: Sergey Nguyen  Clinician NPI: [id number]  Patient: Thais Castellon  Patient : 1960  Patient Address: [address] , [place] [id number]  Patient Phone: [phone]  Visit Protocol: Yeast infection  Patient Summary:  Thais is a 58 year old ( : 1960 ) female who initiated a Visit for a presumed vaginal yeast infection. When asked the question \"Please sign me up to receive news, health information and promotions from [employer].\", Thais responded \"No\".    Thais began noticing vaginal discharge and perivulvar pruritus 0-5 days ago. She has a more than normal amount of thin, clear or white, non-odorous, smooth discharge.   She denies having abdominal pain, vaginal pruritus, perivulvar rash, and open sores. She also denies feeling feverish.   Thais has a history of vaginal yeast infections. She has had zero (0) occurrences in the past year and the current symptoms are similar to previous yeast infections. She has used fluconazole (Diflucan) to treat previous yeast infections. 2 doses of fluconazole (Diflucan) has typically been needed for symptoms to resolve in the past.  She has attempted to treat her symptoms with anti-fungal cream for yeast infections and has used the anti-fungal medication as directed. Anti-fungal medication used to treat symptoms as reported by the patient (free text): Fluconazle tablet 150 mg   She prefers a fluconazole (Diflucan) Pill.   She is currently taking or has taken antibiotics in the past 2 weeks. She denies having a sexually transmitted disease.   She does NOT smoke or use smokeless tobacco.    MEDICATIONS: No current medications, ALLERGIES: NKDA  Clinician Response:  Dear Thais,  Based on the information you have provided, you likely have a vaginal yeast infection which is a common infection of the vagina caused by a fungus.  I am prescribing:   Fluconazole (Diflucan) 150 mg oral tablet. Take 1 tablet by mouth in a single dose. Repeat dose in 3 " days if symptoms are still present. There are no refills with this prescription.  While you have yeast infection symptoms, do the following:      Avoid irritants such as scented bath products, tampons, pads, or vaginal sprays and powders.    Avoid douching.    Wear cotton underwear and if you are comfortable doing so, do not wear underwear to bed.    Avoid hot tubs and whirlpool spas.     Symptoms should improve with 1-2 days of treatment and resolve entirely in 5-7 days. However, there are other types of vaginal infections that have some of the same symptoms as a yeast infection. For this reason, please be seen in person if symptoms have not improved after 3 days or resolved after 10 days.   Diagnosis: Candida vulvovaginitis  Diagnosis ICD: B37.3  Prescription: fluconazole (Diflucan) 150 mg oral tablet 2 tablet, 4 days supply. Take 1 tablet by mouth in a single dose, repeat dose in 3 days if symptoms are still present. Refills: 0, Refill as needed: no, Allow substitutions: yes  Pharmacy: [facility] - [phone] - [address] [place] [address]

## 2019-04-15 ENCOUNTER — TRANSFERRED RECORDS (OUTPATIENT)
Dept: HEALTH INFORMATION MANAGEMENT | Facility: CLINIC | Age: 59
End: 2019-04-15

## 2019-09-25 ENCOUNTER — TRANSFERRED RECORDS (OUTPATIENT)
Dept: HEALTH INFORMATION MANAGEMENT | Facility: CLINIC | Age: 59
End: 2019-09-25

## 2019-10-17 ENCOUNTER — VIRTUAL VISIT (OUTPATIENT)
Dept: FAMILY MEDICINE | Facility: OTHER | Age: 59
End: 2019-10-17

## 2019-10-17 NOTE — PROGRESS NOTES
"Date: 10/17/2019 15:01:46  Clinician: Joseph Juarez  Clinician NPI: [id number]  Patient: Thais Castellon  Patient : 1960  Patient Address: [address] [place] [id number]  Patient Phone: [phone]  Visit Protocol: Yeast infection  Patient Summary:  Thais is a 59 year old ( : 1960 ) female who initiated a Visit for a presumed vaginal yeast infection. When asked the question \"Please sign me up to receive news, health information and promotions. \", Thais responded \"No\".    Thais began noticing vaginal irritation 1-3 days ago.   She denies having abdominal pain, vaginal burning, blisters, open sores, vaginal discharge, and vaginal pruritus. She also denies feeling feverish.   She has attempted to treat her symptoms with anti-fungal cream for yeast infections. Anti-fungal medication used to treat symptoms as reported by the patient (free text): Monostat    Precipitating events  Thais denies having a sexually transmitted disease.   Pertinent medical history  Thais has a history of vaginal yeast infections. She has had zero (0) occurrences in the past year and the current symptoms are similar to previous yeast infections. She has used fluconazole (Diflucan) to treat previous yeast infections. 2 doses of fluconazole (Diflucan) has typically been needed for symptoms to resolve in the past.  She prefers a pill. She denies taking antibiotics in the past 2 weeks.   She does NOT smoke or use smokeless tobacco.      MEDICATIONS: Tylenol Arthritis Pain oral, ALLERGIES: Cefzil  Clinician Response:  Dear Thais,  Based on the information you have provided, you likely have a vaginal yeast infection which is a common infection of the vagina caused by a fungus. Yeast are a type of fungus.  The most common symptom of a yeast infection is itching in and around the vagina. Other signs and symptoms include burning, redness, or a thick, white vaginal discharge that looks like cottage cheese and does not have a bad smell.  " Medication information  I am prescribing:     Fluconazole (Diflucan) 150 mg oral tablet. Take 1 tablet by mouth in a single dose. Repeat dose in 3 days if symptoms are still present. There are no refills with this prescription.   Self care  Steps you can take to prevent symptoms of future vaginal yeast infection:     Avoid irritants such as scented bath products, tampons, pads, or vaginal sprays and powders    Avoid douching    Wear cotton underwear and if you are comfortable doing so, do not wear underwear to bed    Avoid hot tubs and whirlpool spas     When to seek care  Most women notice improvement in their symptoms within 1-2 days after starting treatment with complete clearing in 5-7 days.  Please make an appointment to be seen in a clinic or urgent care if any of the following occur:     Your symptoms have not improved in 3 days or not resolved in 10 days    You develop new symptoms or your symptoms become worse    If you think you may be at risk for an STD      Diagnosis: Candida vulvovaginitis  Diagnosis ICD: B37.3  Prescription: fluconazole (Diflucan) 150 mg oral tablet 2 tablet, 4 days supply. Take 1 tablet by mouth in a single dose, repeat dose in 3 days if symptoms are still present. Refills: 0, Refill as needed: no, Allow substitutions: yes  Pharmacy: [facility] - [phone] - [address] [place] [address]

## 2019-12-19 ENCOUNTER — TRANSFERRED RECORDS (OUTPATIENT)
Dept: HEALTH INFORMATION MANAGEMENT | Facility: CLINIC | Age: 59
End: 2019-12-19

## 2020-05-19 ENCOUNTER — TELEPHONE (OUTPATIENT)
Dept: FAMILY MEDICINE | Facility: CLINIC | Age: 60
End: 2020-05-19

## 2020-05-26 ENCOUNTER — VIRTUAL VISIT (OUTPATIENT)
Dept: FAMILY MEDICINE | Facility: OTHER | Age: 60
End: 2020-05-26

## 2020-05-26 NOTE — PROGRESS NOTES
"Date: 2020 11:48:32  Clinician: Mar Curtis  Clinician NPI: [id number]  Patient: Thais Castellon  Patient : 1960  [place] Address: [address] [place] [id number]  Patient Phone: [phone]  Visit Protocol: Yeast infection  Patient Summary:  Thais is a 60 year old ( : 1960 ) female who initiated a Visit for a presumed vaginal yeast infection. When asked the question \"Please sign me up to receive news, health information and promotions. \", Thais responded \"No\".    Thais began noticing vaginal discharge and vaginal pruritus today.   Symptom details   Vaginal discharge: She has a more than normal amount of white, smooth discharge. The discharge does not have a fishy smell.    She denies having abdominal pain, vaginal burning, blisters, open sores, and vaginal irritation. She also denies feeling feverish.   She has not tried to treat her current symptoms with any medication.   Precipitating events  Thais denies having a sexually transmitted disease.   Pertinent medical history  Thais has a history of vaginal yeast infections. She has had one (1) occurrence in the past year and the current symptoms are similar to previous yeast infections. She has used fluconazole (Diflucan) to treat previous yeast infections. 2 doses of fluconazole (Diflucan) has typically been needed for symptoms to resolve in the past.  She prefers a pill. She is currently taking or has taken antibiotics in the past 2 weeks.   She does NOT smoke or use smokeless tobacco.      MEDICATIONS: amoxicillin oral, Tylenol Arthritis Pain oral, ALLERGIES: Cefzil  Clinician Response:  Dear Thais,  Based on the information you have provided, you likely have a vaginal yeast infection which is a common infection of the vagina caused by a fungus. Yeast are a type of fungus.  The most common symptom of a yeast infection is itching in and around the vagina. Other signs and symptoms include burning, redness, or a thick, white vaginal discharge " that looks like cottage cheese and does not have a bad smell.  Medication information  I am prescribing:     Fluconazole (Diflucan) 150 mg oral tablet. Take 1 tablet by mouth in a single dose. Repeat dose in 3 days if symptoms are still present. There are no refills with this prescription.   Self care  Steps you can take to prevent symptoms of future vaginal yeast infection:     Avoid irritants such as scented bath products, tampons, pads, or vaginal sprays and powders    Avoid douching    Wear cotton underwear and if you are comfortable doing so, do not wear underwear to bed    Avoid hot tubs and whirlpool spas     When to seek care  Most women notice improvement in their symptoms within 1-2 days after starting treatment with complete clearing in 5-7 days.  Please make an appointment to be seen in a clinic or urgent care if any of the following occur:     Your symptoms have not improved in 3 days or not resolved in 10 days    You develop new symptoms or your symptoms become worse    If you think you may be at risk for an STD      Diagnosis: Candida vulvovaginitis  Diagnosis ICD: B37.3  Prescription: fluconazole (Diflucan) 150 mg oral tablet 2 tablet, 4 days supply. Take 1 tablet by mouth in a single dose, repeat dose in 3 days if symptoms are still present. Refills: 0, Refill as needed: no, Allow substitutions: yes

## 2020-06-03 ENCOUNTER — TRANSFERRED RECORDS (OUTPATIENT)
Dept: HEALTH INFORMATION MANAGEMENT | Facility: CLINIC | Age: 60
End: 2020-06-03

## 2020-11-03 ENCOUNTER — TRANSFERRED RECORDS (OUTPATIENT)
Dept: HEALTH INFORMATION MANAGEMENT | Facility: CLINIC | Age: 60
End: 2020-11-03

## 2020-12-09 ENCOUNTER — TRANSFERRED RECORDS (OUTPATIENT)
Dept: HEALTH INFORMATION MANAGEMENT | Facility: CLINIC | Age: 60
End: 2020-12-09

## 2021-01-28 ENCOUNTER — OFFICE VISIT (OUTPATIENT)
Dept: DERMATOLOGY | Facility: CLINIC | Age: 61
End: 2021-01-28
Payer: COMMERCIAL

## 2021-01-28 ENCOUNTER — TELEPHONE (OUTPATIENT)
Dept: DERMATOLOGY | Facility: CLINIC | Age: 61
End: 2021-01-28

## 2021-01-28 VITALS — DIASTOLIC BLOOD PRESSURE: 77 MMHG | SYSTOLIC BLOOD PRESSURE: 127 MMHG | HEART RATE: 66 BPM | OXYGEN SATURATION: 99 %

## 2021-01-28 DIAGNOSIS — L30.9 ACUTE DERMATITIS: ICD-10-CM

## 2021-01-28 DIAGNOSIS — L30.9 DERMATITIS: Primary | ICD-10-CM

## 2021-01-28 DIAGNOSIS — L72.0 EPIDERMAL CYST: Primary | ICD-10-CM

## 2021-01-28 PROCEDURE — 99203 OFFICE O/P NEW LOW 30 MIN: CPT | Performed by: PHYSICIAN ASSISTANT

## 2021-01-28 RX ORDER — BETAMETHASONE DIPROPIONATE 0.5 MG/G
CREAM TOPICAL
Qty: 50 G | Refills: 0 | Status: SHIPPED | OUTPATIENT
Start: 2021-01-28 | End: 2023-01-13

## 2021-01-28 RX ORDER — DESOXIMETASONE 0.5 MG/G
CREAM TOPICAL
Qty: 100 G | Refills: 1 | Status: SHIPPED | OUTPATIENT
Start: 2021-01-28 | End: 2023-01-13

## 2021-01-28 NOTE — TELEPHONE ENCOUNTER
This cream is not covered, please change medication or start pa process.    Prior Authorization Retail Medication Request    Medication/Dose: Desoximetasone 0.05% cream  ICD code (if different than what is on RX):    Previously Tried and Failed:    Rationale:      Insurance Name:  [employer]  Insurance ID:  [id number]      Pharmacy Information (if different than what is on RX)  Name:    Phone:    Thanks,   Andressa Estevez  Certified Pharmacy Technician  [facility]  [phone]

## 2021-01-28 NOTE — NURSING NOTE
Chief Complaint   Patient presents with     Derm Problem     poss cyst       Vitals:    01/28/21 0712   BP: 127/77   Pulse: 66   SpO2: 99%     Wt Readings from Last 1 Encounters:   02/16/17 63.5 kg (140 lb)       Janene Anton LPN.................1/28/2021

## 2021-01-28 NOTE — LETTER
1/28/2021         RE: Thais Castellon  [address]  [place] [address]        Dear Colleague,    Thank you for referring your patient, Thais Castellon, to the [facility]. Please see a copy of my visit note below.    Thais Castellon is an extremely pleasant 60 year old year old female patient here today for spot on back. Present for one year, growing this past month. No pain. She also notes she will get recurrent rash on arms, legs. She notes desoximetasone cream is clears rash but then returns.  Patient has no other skin complaints today.  Remainder of the HPI, Meds, PMH, Allergies, FH, and SH was reviewed in chart.    Past Medical History:   Diagnosis Date     Anemia, unspecified     resolved .w hyst     Dysplasia of cervix, unspecified 2004    ASMITA III on cervical path with hyst     Leiomyoma of uterus, unspecified     Hyst 2004     Ulnar collateral ligament sprain(841.1)     Ulnar collateral ligament     Unspecified polyarthropathy or polyarthritis, multiple sites     Parvovirus infection 3/05       Past Surgical History:   Procedure Laterality Date     ARTHROPLASTY KNEE  4/7/2014    Procedure: ARTHROPLASTY KNEE;  Left Total Knee Arthroplasty;  Surgeon: Kaiden Arteaga MD;  Location: [facility]     ARTHROSCOPY KNEE RT/LT  age 11    Left knee     C REVISE THUMB TENDON  5/2003    Reconstruct ulnar collateral ligament-Right     D & C  x 3    Missed Ab's     HC LAPAROSCOPY, SURGICAL, ABDOMEN, PERITONEUM & OMENTUM; DX W/ OR W/O SPECIMEN(S)  1994    Dx lap for infertility     HYSTERECTOMY, PAP NO LONGER INDICATED       HYSTERECTOMY, VAGINAL  5/2004     LAMINECTOMY LUMBAR ONE LEVEL  2/29/2012    Procedure:LAMINECTOMY LUMBAR ONE LEVEL; Left Lumbar 3-4 Foraminotomy (c-arm); Surgeon:BRYAN HANSEN; Location:[facility]     ORTHOPEDIC SURGERY  9/7/2010    open repair left great toe laceration     SURGICAL HISTORY OF -   2009    Debridement of lateral meniscus, lateral compartment with  debridement of the patellofemoral spur superior lateral patella.      SURGICAL HISTORY OF -   2010    Open wound, left great toe, possible laceration of the extensor tendon, left great toe.         Family History   Problem Relation Age of Onset     Arthritis Mother         Rheumatoid     Hypertension Mother      Cancer Mother         basal cell on leg     Respiratory Brother         Asthma     Neurologic Disorder Paternal Grandfather         parkinsons     Respiratory Brother         asthma     Respiratory Son         asthma, allergies     Breast Cancer No family hx of      Cancer - colorectal No family hx of        Social History     Socioeconomic History     Marital status:      Spouse name: Solitario     Number of children: 2     Years of education: 12+     Highest education level: Not on file   Occupational History     Occupation: Cardiac      Employer: [employer]   Social Needs     Financial resource strain: Not on file     Food insecurity     Worry: Not on file     Inability: Not on file     Transportation needs     Medical: Not on file     Non-medical: Not on file   Tobacco Use     Smoking status: Never Smoker     Smokeless tobacco: Never Used   Substance and Sexual Activity     Alcohol use: Yes     Comment: 2 drinks per month     Drug use: No     Sexual activity: Yes     Partners: Male     Birth control/protection: Surgical     Comment: Hyst   Lifestyle     Physical activity     Days per week: Not on file     Minutes per session: Not on file     Stress: Not on file   Relationships     Social connections     Talks on phone: Not on file     Gets together: Not on file     Attends Jainism service: Not on file     Active member of club or organization: Not on file     Attends meetings of clubs or organizations: Not on file     Relationship status: Not on file     Intimate partner violence     Fear of current or ex partner: Not on file     Emotionally abused: Not on file      Physically abused: Not on file     Forced sexual activity: Not on file   Other Topics Concern      Service No     Blood Transfusions No     Caffeine Concern No     Occupational Exposure No     Hobby Hazards No     Sleep Concern Yes     Comment: on ambien     Stress Concern Yes     Weight Concern No     Special Diet No     Back Care No     Exercise Yes     Comment: run and rides bike 5-6 X week     Bike Helmet Yes     Seat Belt Yes     Self-Exams Yes     Parent/sibling w/ CABG, MI or angioplasty before 65F 55M? No   Social History Narrative     Not on file       Outpatient Encounter Medications as of 1/28/2021   Medication Sig Dispense Refill     desoximetasone (TOPICORT LP) 0.05 % external cream Apply to affected area once or twice daily for 1-2 weeks, then only apply as needed 100 g 1     Multiple Vitamin (MULTI-VITAMIN) per tablet Take 1 tablet by mouth daily.       amoxicillin-clavulanate (AUGMENTIN) 875-125 MG per tablet Take 1 tablet by mouth 2 times daily (Patient not taking: Reported on 1/28/2021) 28 tablet 0     fluconazole (DIFLUCAN) 150 MG tablet Take 1 tablet (150 mg) by mouth every 3 days (Patient not taking: Reported on 1/28/2021) 4 tablet 0     trimethoprim-polymyxin b (POLYTRIM) ophthalmic solution Apply 1 drop to eye 4 times daily (Patient not taking: Reported on 1/28/2021) 1 Bottle 0     [DISCONTINUED] Desoximetasone 0.05 % CREA Apply to affected area once or twice daily for 1-2 weeks, then only apply as needed 100 g 1     No facility-administered encounter medications on file as of 1/28/2021.              Review Of Systems  Skin: As above  Eyes: negative  Ears/Nose/Throat: negative  Respiratory: No shortness of breath, dyspnea on exertion, cough      O:   NAD, WDWN, Alert & Oriented, Mood & Affect wnl, Vitals stable   Here today alone   /77   Pulse 66   SpO2 99%    General appearance normal   Vitals stable   Alert, oriented and in no acute distress     Pink scaly papules on arms,  legs, face   Skin colored nodule on upper back     Eyes: Conjunctivae/lids:Normal     ENT: Lips: normal    MSK:Normal    Cardiovascular: peripheral edema none    Pulm: Breathing Normal    Neuro/Psych: Orientation:Alert and Orientedx3 ; Mood/Affect:normal   A/P:  1. Nummular eczema vs psoriasis   Use desoximetasone apply twice daily as needed.   Skin care regimen reviewed with patient: Eliminate harsh soaps, i.e. Dial, zest, irsih spring; Mild soaps such as Cetaphil or Dove sensitive skin, avoid hot or cold showers, aggressive use of emollients including vanicream, cetaphil or cerave discussed with patient.    2. Epidermal cyst on upper back  Discussed excision, will schedule with Dr. Pacheco.         Again, thank you for allowing me to participate in the care of your patient.        Sincerely,        Evonne Carter PA-C

## 2021-01-28 NOTE — PROGRESS NOTES
Thais Castellon is an extremely pleasant 60 year old year old female patient here today for spot on back. Present for one year, growing this past month. No pain. She also notes she will get recurrent rash on arms, legs. She notes desoximetasone cream is clears rash but then returns.  Patient has no other skin complaints today.  Remainder of the HPI, Meds, PMH, Allergies, FH, and SH was reviewed in chart.    Past Medical History:   Diagnosis Date     Anemia, unspecified     resolved .w hyst     Dysplasia of cervix, unspecified 2004    ASMITA III on cervical path with hyst     Leiomyoma of uterus, unspecified     Hyst 2004     Ulnar collateral ligament sprain(841.1)     Ulnar collateral ligament     Unspecified polyarthropathy or polyarthritis, multiple sites     Parvovirus infection 3/05       Past Surgical History:   Procedure Laterality Date     ARTHROPLASTY KNEE  4/7/2014    Procedure: ARTHROPLASTY KNEE;  Left Total Knee Arthroplasty;  Surgeon: Kaiden Arteaga MD;  Location: [facility]     ARTHROSCOPY KNEE RT/LT  age 11    Left knee     C REVISE THUMB TENDON  5/2003    Reconstruct ulnar collateral ligament-Right     D & C  x 3    Missed Ab's     HC LAPAROSCOPY, SURGICAL, ABDOMEN, PERITONEUM & OMENTUM; DX W/ OR W/O SPECIMEN(S)  1994    Dx lap for infertility     HYSTERECTOMY, PAP NO LONGER INDICATED       HYSTERECTOMY, VAGINAL  5/2004     LAMINECTOMY LUMBAR ONE LEVEL  2/29/2012    Procedure:LAMINECTOMY LUMBAR ONE LEVEL; Left Lumbar 3-4 Foraminotomy (c-arm); Surgeon:BRYAN HANSEN; Location:[facility]     ORTHOPEDIC SURGERY  9/7/2010    open repair left great toe laceration     SURGICAL HISTORY OF -   2009    Debridement of lateral meniscus, lateral compartment with debridement of the patellofemoral spur superior lateral patella.      SURGICAL HISTORY OF -   2010    Open wound, left great toe, possible laceration of the extensor tendon, left great toe.         Family History   Problem Relation Age of Onset      Arthritis Mother         Rheumatoid     Hypertension Mother      Cancer Mother         basal cell on leg     Respiratory Brother         Asthma     Neurologic Disorder Paternal Grandfather         parkinsons     Respiratory Brother         asthma     Respiratory Son         asthma, allergies     Breast Cancer No family hx of      Cancer - colorectal No family hx of        Social History     Socioeconomic History     Marital status:      Spouse name: Solitario     Number of children: 2     Years of education: 12+     Highest education level: Not on file   Occupational History     Occupation: Cardiac      Employer: [employer]   Social Needs     Financial resource strain: Not on file     Food insecurity     Worry: Not on file     Inability: Not on file     Transportation needs     Medical: Not on file     Non-medical: Not on file   Tobacco Use     Smoking status: Never Smoker     Smokeless tobacco: Never Used   Substance and Sexual Activity     Alcohol use: Yes     Comment: 2 drinks per month     Drug use: No     Sexual activity: Yes     Partners: Male     Birth control/protection: Surgical     Comment: Hyst   Lifestyle     Physical activity     Days per week: Not on file     Minutes per session: Not on file     Stress: Not on file   Relationships     Social connections     Talks on phone: Not on file     Gets together: Not on file     Attends Gnosticist service: Not on file     Active member of club or organization: Not on file     Attends meetings of clubs or organizations: Not on file     Relationship status: Not on file     Intimate partner violence     Fear of current or ex partner: Not on file     Emotionally abused: Not on file     Physically abused: Not on file     Forced sexual activity: Not on file   Other Topics Concern      Service No     Blood Transfusions No     Caffeine Concern No     Occupational Exposure No     Hobby Hazards No     Sleep Concern Yes     Comment:  on ambien     Stress Concern Yes     Weight Concern No     Special Diet No     Back Care No     Exercise Yes     Comment: run and rides bike 5-6 X week     Bike Helmet Yes     Seat Belt Yes     Self-Exams Yes     Parent/sibling w/ CABG, MI or angioplasty before 65F 55M? No   Social History Narrative     Not on file       Outpatient Encounter Medications as of 1/28/2021   Medication Sig Dispense Refill     desoximetasone (TOPICORT LP) 0.05 % external cream Apply to affected area once or twice daily for 1-2 weeks, then only apply as needed 100 g 1     Multiple Vitamin (MULTI-VITAMIN) per tablet Take 1 tablet by mouth daily.       amoxicillin-clavulanate (AUGMENTIN) 875-125 MG per tablet Take 1 tablet by mouth 2 times daily (Patient not taking: Reported on 1/28/2021) 28 tablet 0     fluconazole (DIFLUCAN) 150 MG tablet Take 1 tablet (150 mg) by mouth every 3 days (Patient not taking: Reported on 1/28/2021) 4 tablet 0     trimethoprim-polymyxin b (POLYTRIM) ophthalmic solution Apply 1 drop to eye 4 times daily (Patient not taking: Reported on 1/28/2021) 1 Bottle 0     [DISCONTINUED] Desoximetasone 0.05 % CREA Apply to affected area once or twice daily for 1-2 weeks, then only apply as needed 100 g 1     No facility-administered encounter medications on file as of 1/28/2021.              Review Of Systems  Skin: As above  Eyes: negative  Ears/Nose/Throat: negative  Respiratory: No shortness of breath, dyspnea on exertion, cough      O:   NAD, WDWN, Alert & Oriented, Mood & Affect wnl, Vitals stable   Here today alone   /77   Pulse 66   SpO2 99%    General appearance normal   Vitals stable   Alert, oriented and in no acute distress     Pink scaly papules on arms, legs, face   Skin colored nodule on upper back     Eyes: Conjunctivae/lids:Normal     ENT: Lips: normal    MSK:Normal    Cardiovascular: peripheral edema none    Pulm: Breathing Normal    Neuro/Psych: Orientation:Alert and Orientedx3 ; Mood/Affect:normal    A/P:  1. Nummular eczema vs psoriasis   Use desoximetasone apply twice daily as needed.   Skin care regimen reviewed with patient: Eliminate harsh soaps, i.e. Dial, zest, irsih spring; Mild soaps such as Cetaphil or Dove sensitive skin, avoid hot or cold showers, aggressive use of emollients including vanicream, cetaphil or cerave discussed with patient.    2. Epidermal cyst on upper back  Discussed excision, will schedule with Dr. Pacheco.

## 2021-02-17 ENCOUNTER — OFFICE VISIT (OUTPATIENT)
Dept: DERMATOLOGY | Facility: CLINIC | Age: 61
End: 2021-02-17
Payer: COMMERCIAL

## 2021-02-17 VITALS — OXYGEN SATURATION: 98 % | SYSTOLIC BLOOD PRESSURE: 130 MMHG | HEART RATE: 69 BPM | DIASTOLIC BLOOD PRESSURE: 84 MMHG

## 2021-02-17 DIAGNOSIS — L72.0 RUPTURED EPITHELIAL CYST: ICD-10-CM

## 2021-02-17 DIAGNOSIS — L72.0 EPIDERMAL CYST: Primary | ICD-10-CM

## 2021-02-17 PROCEDURE — 12031 INTMD RPR S/A/T/EXT 2.5 CM/<: CPT | Performed by: DERMATOLOGY

## 2021-02-17 PROCEDURE — 11402 EXC TR-EXT B9+MARG 1.1-2 CM: CPT | Mod: 51 | Performed by: DERMATOLOGY

## 2021-02-17 PROCEDURE — 88331 PATH CONSLTJ SURG 1 BLK 1SPC: CPT | Performed by: DERMATOLOGY

## 2021-02-17 NOTE — PATIENT INSTRUCTIONS
Sutured Wound Care     [facility]: [phone]    [facility]: [phone]    back      ? No strenuous activity for 48 hours. Resume moderate activity in 48 hours. No heavy exercising until you are seen for follow up in one week.     ? Take Tylenol as needed for discomfort.                         ? Do not drink alcoholic beverages for 48 hours.     ? Keep the pressure bandage in place for 24 hours. If the bandage becomes blood tinged or loose, reinforce it with gauze and tape.        (Refer to the reverse side of this page for management of bleeding).    ? Remove pressure bandage in 24 hours     ? Leave the flat bandage in place until your follow up appointment.    ? Keep the bandage dry. Wash around it carefully.    ? If the tape becomes soiled or starts to come off, reinforce it with additional paper tape.    ? Do not smoke for 3 weeks; smoking is detrimental to wound healing.    ? It is normal to have swelling and bruising around the surgical site. The bruising will fade in approximately 10-14 days. Elevate the area to reduce swelling.    ? Numbness, itchiness and sensitivity to temperature changes can occur after surgery and may take up to 18 months to normalize.      POSSIBLE COMPLICATIONS    BLEEDIN. Leave the bandage in place.  2. Use tightly rolled up gauze or a cloth to apply direct pressure over the bandage for 20   minutes.  3. Reapply pressure for an additional 20 minutes if necessary  4. Call the office or go to the nearest emergency room if pressure fails to stop the bleeding.  5. Use additional gauze and tape to maintain pressure once the bleeding has stopped.        PAIN:    1. Post operative pain should slowly get better, never worse.  2. A severe increase in pain may indicate a problem. Call the office if this occurs.    In case of emergency phone:Dr Pacheco [phone]      **In one week, remove tega derm and steri strips. Wash the incision site gently with water.  Apply new steri strips and tega derm. Leave on for one more week    WOUND CARE INSTRUCTIONS  for  ONE WEEK AFTER SURGERY      back    1) Leave flat bandage on your skin for one week after today s bandage change.  2) In one week when you remove the bandage, you may resume your regular skin care routine, including washing with mild soap and water, applying moisturizer, make-up and sunscreen.    3) If there are any open or bleeding areas at the incision/graft site you should begin to cover the area with a bandage daily as follows:    1) Clean and dry the area with plain tap water using a Q-tip or sterile gauze pad.  2) Apply Polysporin or Bacitracin ointment to the open area.  3) Cover the wound with a band-aid or a sterile non-stick gauze pad and micropore paper tape.         SIGNS OF INFECTION  - If you notice any of these signs of infection, call your doctor right away: expanding redness around the wound.  - Yellow or greenish-colored pus or cloudy wound drainage.    - Red streaking spreading from the wound.  - Increased swelling, tenderness, or pain around the wound.   - Fever.    Please remember that yellow and clear drainage from a wound can be normal and related to normal wound healing.  Isolated drainage from a wound without a combination of the above features does not indicate infection.       *Once the bandages are removed, the scar will be red and firm (especially in the lip/chin area). This is normal and will fade in time. It might take 6-12 months for this to happen.     *Massaging the area will help the scar soften and fade quicker. Begin to massage the area one month after the bandages have been removed. To massage apply pressure directly and firmly over the scar with the fingertips and move in a circular motion. Massage the area for a few minutes several times a day. Continue to massage the site for several months.    *Approximately 6-8 weeks after surgery it is not uncommon to see the formation of   tender pimple-like  bump along the scar. This is normal. As the scar continues to mature and the stitches underneath the skin begin to dissolve, this might occur. Do not pick or squeeze, this will resolve on it s own. Should one break open producing a small amount of drainage, apply Polysporin or Bacitracin ointment a few times a day until the wound is completely healed.    *Numbness in the surgical area is expected. It might take 12-18 months for the feeling to return to normal. During this time sensations of itchiness, tingling and occasional sharp pains might be noted. These feelings are normal and will subside once the nerves have completely healed.         IN CASE OF EMERGENCY: Dr Pacheco [phone]     If you were seen in [place] call: [phone]    If you were seen in [place] call: [phone]

## 2021-02-17 NOTE — LETTER
2/17/2021         RE: Thais Castellon  [address]  [place] [address]        Dear Colleague,    Thank you for referring your patient, Thais Castellon, to the [facility]. Please see a copy of my visit note below.    Thais Castellon is an extremely pleasant 60 year old year old female patient here today for tender cyst on mid upper back. Patient has no other skin complaints today.  Remainder of the HPI, Meds, PMH, Allergies, FH, and SH was reviewed in chart.      Past Medical History:   Diagnosis Date     Anemia, unspecified     resolved .w hyst     Dysplasia of cervix, unspecified 2004    ASMITA III on cervical path with hyst     Leiomyoma of uterus, unspecified     Hyst 2004     Ulnar collateral ligament sprain(841.1)     Ulnar collateral ligament     Unspecified polyarthropathy or polyarthritis, multiple sites     Parvovirus infection 3/05       Past Surgical History:   Procedure Laterality Date     ARTHROPLASTY KNEE  4/7/2014    Procedure: ARTHROPLASTY KNEE;  Left Total Knee Arthroplasty;  Surgeon: Kaiden Arteaga MD;  Location: [facility] OR     ARTHROSCOPY KNEE RT/LT  age 11    Left knee     C REVISE THUMB TENDON  5/2003    Reconstruct ulnar collateral ligament-Right     D & C  x 3    Missed Ab's     HC LAPAROSCOPY, SURGICAL, ABDOMEN, PERITONEUM & OMENTUM; DX W/ OR W/O SPECIMEN(S)  1994    Dx lap for infertility     HYSTERECTOMY, PAP NO LONGER INDICATED       HYSTERECTOMY, VAGINAL  5/2004     LAMINECTOMY LUMBAR ONE LEVEL  2/29/2012    Procedure:LAMINECTOMY LUMBAR ONE LEVEL; Left Lumbar 3-4 Foraminotomy (c-arm); Surgeon:BRYAN HANSEN; Location:[facility] OR     ORTHOPEDIC SURGERY  9/7/2010    open repair left great toe laceration     SURGICAL HISTORY OF -   2009    Debridement of lateral meniscus, lateral compartment with debridement of the patellofemoral spur superior lateral patella.      SURGICAL HISTORY OF -   2010    Open wound, left great toe, possible laceration of the extensor  tendon, left great toe.         Family History   Problem Relation Age of Onset     Arthritis Mother         Rheumatoid     Hypertension Mother      Cancer Mother         basal cell on leg     Respiratory Brother         Asthma     Neurologic Disorder Paternal Grandfather         parkinsons     Respiratory Brother         asthma     Respiratory Son         asthma, allergies     Breast Cancer No family hx of      Cancer - colorectal No family hx of        Social History     Socioeconomic History     Marital status:      Spouse name: Solitario     Number of children: 2     Years of education: 12+     Highest education level: Not on file   Occupational History     Occupation: Cardiac      Employer: [employer]   Social Needs     Financial resource strain: Not on file     Food insecurity     Worry: Not on file     Inability: Not on file     Transportation needs     Medical: Not on file     Non-medical: Not on file   Tobacco Use     Smoking status: Never Smoker     Smokeless tobacco: Never Used   Substance and Sexual Activity     Alcohol use: Yes     Comment: 2 drinks per month     Drug use: No     Sexual activity: Yes     Partners: Male     Birth control/protection: Surgical     Comment: Hyst   Lifestyle     Physical activity     Days per week: Not on file     Minutes per session: Not on file     Stress: Not on file   Relationships     Social connections     Talks on phone: Not on file     Gets together: Not on file     Attends Shinto service: Not on file     Active member of club or organization: Not on file     Attends meetings of clubs or organizations: Not on file     Relationship status: Not on file     Intimate partner violence     Fear of current or ex partner: Not on file     Emotionally abused: Not on file     Physically abused: Not on file     Forced sexual activity: Not on file   Other Topics Concern      Service No     Blood Transfusions No     Caffeine Concern No      Occupational Exposure No     Hobby Hazards No     Sleep Concern Yes     Comment: on ambien     Stress Concern Yes     Weight Concern No     Special Diet No     Back Care No     Exercise Yes     Comment: run and rides bike 5-6 X week     Bike Helmet Yes     Seat Belt Yes     Self-Exams Yes     Parent/sibling w/ CABG, MI or angioplasty before 65F 55M? No   Social History Narrative     Not on file       Outpatient Encounter Medications as of 2/17/2021   Medication Sig Dispense Refill     augmented betamethasone dipropionate (DIPROLENE-AF) 0.05 % external cream Apply sparingly twice daily as needed for 1-2 weeks 50 g 0     desoximetasone (TOPICORT LP) 0.05 % external cream Apply to affected area once or twice daily for 1-2 weeks, then only apply as needed 100 g 1     Multiple Vitamin (MULTI-VITAMIN) per tablet Take 1 tablet by mouth daily.       amoxicillin-clavulanate (AUGMENTIN) 875-125 MG per tablet Take 1 tablet by mouth 2 times daily (Patient not taking: Reported on 1/28/2021) 28 tablet 0     fluconazole (DIFLUCAN) 150 MG tablet Take 1 tablet (150 mg) by mouth every 3 days (Patient not taking: Reported on 1/28/2021) 4 tablet 0     trimethoprim-polymyxin b (POLYTRIM) ophthalmic solution Apply 1 drop to eye 4 times daily (Patient not taking: Reported on 1/28/2021) 1 Bottle 0     No facility-administered encounter medications on file as of 2/17/2021.              Review Of Systems  Skin: As above  Eyes: negative  Ears/Nose/Throat: negative  Respiratory: No shortness of breath, dyspnea on exertion, cough, or hemoptysis  Cardiovascular: negative  Gastrointestinal: negative  Genitourinary: negative  Musculoskeletal: negative  Neurologic: negative  Psychiatric: negative  Hematologic/Lymphatic/Immunologic: negative  Endocrine: negative      O:   NAD, WDWN, Alert & Oriented, Mood & Affect wnl, Vitals stable   Here today alone   /84   Pulse 69   SpO2 98%    General appearance normal   Vitals stable   Alert,  oriented and in no acute distress     Mid upper back 1.2cm nodule with comedone      Eyes: Conjunctivae/lids:Normal     ENT: Lips, buccal mucosa, tongue: normal    MSK:Normal    Cardiovascular: peripheral edema none    Pulm: Breathing Normal    Neuro/Psych: Orientation:Alert and Orientedx3 ; Mood/Affect:normal       MICRO:   Mid upepr back: Normal epidermis with cyst lined by stratified squamous epithelium with epidermal keratinization   A/P:  1.mid upper back epidermal cyst  EXCISION OF CYST AND INT: After thorough discussion of PGACAC, consent obtained, anesthesia and prep, the margins of the cyst were identified and an elliptical incision was made encompassing the cyst. The incisions were made through the skin and down to and including the subcutaneous tissue. The cyst was removed en bloc and submitted for frozen pathologic review. hemostasis was achieved. The wound edges were then closed in a layered fashion, being careful not to leave any dead space. Postoperative length was 1.5 cm.   EBL minimal; complications none; wound care routine. The patient was discharged in good condition and will return in one week for wound evaluation.    It was a pleasure speaking to Thais Castellon today.  BENIGN LESIONS DISCUSSED WITH PATIENT:  I discussed the specifics of tumor, prognosis, and genetics of benign lesions.  I explained that treatment of these lesions would be purely cosmetic and not medically neccessary.  I discussed with patient different removal options including excision, cautery and /or laser.            Again, thank you for allowing me to participate in the care of your patient.        Sincerely,        Chris Pacheco MD

## 2021-02-17 NOTE — PROGRESS NOTES
Thais Castellon is an extremely pleasant 60 year old year old female patient here today for tender cyst on mid upper back. Patient has no other skin complaints today.  Remainder of the HPI, Meds, PMH, Allergies, FH, and SH was reviewed in chart.      Past Medical History:   Diagnosis Date     Anemia, unspecified     resolved .w hyst     Dysplasia of cervix, unspecified 2004    ASMITA III on cervical path with hyst     Leiomyoma of uterus, unspecified     Hyst 2004     Ulnar collateral ligament sprain(841.1)     Ulnar collateral ligament     Unspecified polyarthropathy or polyarthritis, multiple sites     Parvovirus infection 3/05       Past Surgical History:   Procedure Laterality Date     ARTHROPLASTY KNEE  4/7/2014    Procedure: ARTHROPLASTY KNEE;  Left Total Knee Arthroplasty;  Surgeon: Kaiden Arteaga MD;  Location: [facility]     ARTHROSCOPY KNEE RT/LT  age 11    Left knee     C REVISE THUMB TENDON  5/2003    Reconstruct ulnar collateral ligament-Right     D & C  x 3    Missed Ab's     HC LAPAROSCOPY, SURGICAL, ABDOMEN, PERITONEUM & OMENTUM; DX W/ OR W/O SPECIMEN(S)  1994    Dx lap for infertility     HYSTERECTOMY, PAP NO LONGER INDICATED       HYSTERECTOMY, VAGINAL  5/2004     LAMINECTOMY LUMBAR ONE LEVEL  2/29/2012    Procedure:LAMINECTOMY LUMBAR ONE LEVEL; Left Lumbar 3-4 Foraminotomy (c-arm); Surgeon:BRYAN HANSEN; Location:[facility]     ORTHOPEDIC SURGERY  9/7/2010    open repair left great toe laceration     SURGICAL HISTORY OF -   2009    Debridement of lateral meniscus, lateral compartment with debridement of the patellofemoral spur superior lateral patella.      SURGICAL HISTORY OF -   2010    Open wound, left great toe, possible laceration of the extensor tendon, left great toe.         Family History   Problem Relation Age of Onset     Arthritis Mother         Rheumatoid     Hypertension Mother      Cancer Mother         basal cell on leg     Respiratory Brother         Asthma     Neurologic Disorder  Paternal Grandfather         parkinsons     Respiratory Brother         asthma     Respiratory Son         asthma, allergies     Breast Cancer No family hx of      Cancer - colorectal No family hx of        Social History     Socioeconomic History     Marital status:      Spouse name: Solitario     Number of children: 2     Years of education: 12+     Highest education level: Not on file   Occupational History     Occupation: Cardiac      Employer: [employer]   Social Needs     Financial resource strain: Not on file     Food insecurity     Worry: Not on file     Inability: Not on file     Transportation needs     Medical: Not on file     Non-medical: Not on file   Tobacco Use     Smoking status: Never Smoker     Smokeless tobacco: Never Used   Substance and Sexual Activity     Alcohol use: Yes     Comment: 2 drinks per month     Drug use: No     Sexual activity: Yes     Partners: Male     Birth control/protection: Surgical     Comment: Hyst   Lifestyle     Physical activity     Days per week: Not on file     Minutes per session: Not on file     Stress: Not on file   Relationships     Social connections     Talks on phone: Not on file     Gets together: Not on file     Attends Quaker service: Not on file     Active member of club or organization: Not on file     Attends meetings of clubs or organizations: Not on file     Relationship status: Not on file     Intimate partner violence     Fear of current or ex partner: Not on file     Emotionally abused: Not on file     Physically abused: Not on file     Forced sexual activity: Not on file   Other Topics Concern      Service No     Blood Transfusions No     Caffeine Concern No     Occupational Exposure No     Hobby Hazards No     Sleep Concern Yes     Comment: on ambien     Stress Concern Yes     Weight Concern No     Special Diet No     Back Care No     Exercise Yes     Comment: run and rides bike 5-6 X week     Bike Helmet  Yes     Seat Belt Yes     Self-Exams Yes     Parent/sibling w/ CABG, MI or angioplasty before 65F 55M? No   Social History Narrative     Not on file       Outpatient Encounter Medications as of 2/17/2021   Medication Sig Dispense Refill     augmented betamethasone dipropionate (DIPROLENE-AF) 0.05 % external cream Apply sparingly twice daily as needed for 1-2 weeks 50 g 0     desoximetasone (TOPICORT LP) 0.05 % external cream Apply to affected area once or twice daily for 1-2 weeks, then only apply as needed 100 g 1     Multiple Vitamin (MULTI-VITAMIN) per tablet Take 1 tablet by mouth daily.       amoxicillin-clavulanate (AUGMENTIN) 875-125 MG per tablet Take 1 tablet by mouth 2 times daily (Patient not taking: Reported on 1/28/2021) 28 tablet 0     fluconazole (DIFLUCAN) 150 MG tablet Take 1 tablet (150 mg) by mouth every 3 days (Patient not taking: Reported on 1/28/2021) 4 tablet 0     trimethoprim-polymyxin b (POLYTRIM) ophthalmic solution Apply 1 drop to eye 4 times daily (Patient not taking: Reported on 1/28/2021) 1 Bottle 0     No facility-administered encounter medications on file as of 2/17/2021.              Review Of Systems  Skin: As above  Eyes: negative  Ears/Nose/Throat: negative  Respiratory: No shortness of breath, dyspnea on exertion, cough, or hemoptysis  Cardiovascular: negative  Gastrointestinal: negative  Genitourinary: negative  Musculoskeletal: negative  Neurologic: negative  Psychiatric: negative  Hematologic/Lymphatic/Immunologic: negative  Endocrine: negative      O:   NAD, WDWN, Alert & Oriented, Mood & Affect wnl, Vitals stable   Here today alone   /84   Pulse 69   SpO2 98%    General appearance normal   Vitals stable   Alert, oriented and in no acute distress     Mid upper back 1.2cm nodule with comedone      Eyes: Conjunctivae/lids:Normal     ENT: Lips, buccal mucosa, tongue: normal    MSK:Normal    Cardiovascular: peripheral edema none    Pulm: Breathing Normal    Neuro/Psych:  Orientation:Alert and Orientedx3 ; Mood/Affect:normal       MICRO:   Mid upepr back: Normal epidermis with ruptured cyst in acute inflammatory reaction in dermis,   A/P:  1.mid upper back epidermal cyst  EXCISION OF CYST AND INT: After thorough discussion of PGACAC, consent obtained, anesthesia and prep, the margins of the cyst were identified and an elliptical incision was made encompassing the cyst. The incisions were made through the skin and down to and including the subcutaneous tissue. The cyst was removed en bloc and submitted for frozen pathologic review. hemostasis was achieved. The wound edges were then closed in a layered fashion, being careful not to leave any dead space. Postoperative length was 1.5 cm.   EBL minimal; complications none; wound care routine. The patient was discharged in good condition and will return in one week for wound evaluation.    It was a pleasure speaking to Thais BRO Cande today.  BENIGN LESIONS DISCUSSED WITH PATIENT:  I discussed the specifics of tumor, prognosis, and genetics of benign lesions.  I explained that treatment of these lesions would be purely cosmetic and not medically neccessary.  I discussed with patient different removal options including excision, cautery and /or laser.

## 2021-06-24 ENCOUNTER — MEDICAL CORRESPONDENCE (OUTPATIENT)
Dept: HEALTH INFORMATION MANAGEMENT | Facility: CLINIC | Age: 61
End: 2021-06-24

## 2021-06-24 ENCOUNTER — TRANSFERRED RECORDS (OUTPATIENT)
Dept: HEALTH INFORMATION MANAGEMENT | Facility: CLINIC | Age: 61
End: 2021-06-24

## 2021-07-15 ENCOUNTER — HOSPITAL ENCOUNTER (OUTPATIENT)
Dept: PHYSICAL THERAPY | Facility: CLINIC | Age: 61
Setting detail: THERAPIES SERIES
End: 2021-07-15
Attending: ORTHOPAEDIC SURGERY
Payer: COMMERCIAL

## 2021-07-15 PROCEDURE — 97110 THERAPEUTIC EXERCISES: CPT | Mod: GP | Performed by: PHYSICAL THERAPIST

## 2021-07-15 PROCEDURE — 97161 PT EVAL LOW COMPLEX 20 MIN: CPT | Mod: GP | Performed by: PHYSICAL THERAPIST

## 2021-07-15 NOTE — PROGRESS NOTES
07/15/21 0600   General Information   Type of Visit Initial OP Ortho PT Evaluation   Start of Care Date 07/15/21   Referring Physician Dr. Gonzalez   Patient/Family Goals Statement to no longer have hip pain, fix issue   Orders Evaluate and Treat   Date of Order 06/24/21   Certification Required? No   Medical Diagnosis right hip trochanteric bursitis   Surgical/Medical history reviewed Yes   Precautions/Limitations no known precautions/limitations   General Information Comments Lumbar surgery (L4-5) about 10 years ago - laminectomy, to release nerves Dr Peck   Body Part(s)   Body Part(s) Hip   Presentation and Etiology   Pertinent history of current problem (include personal factors and/or comorbidities that impact the POC) Pt reports hx of left TKA 2014; more acute right lateral hip pain over the past few months.  MD diagnosed her with trochanteric bursitis.  Sitting and standing too long typically set it off; pt feels it is related lumbar surgery ~10 years prior.  Avid exerciser with walking, strength training 5 days / week.   Impairments A. Pain;E. Decreased flexibility;J. Burning   Functional Limitations perform activities of daily living;perform required work activities;perform desired leisure / sports activities   Symptom Location right trochanteric bursitis; some piriformis   How/Where did it occur From insidious onset   Onset date of current episode/exacerbation 04/15/21   Chronicity Chronic   Pain rating (0-10 point scale) Best (/10);Worst (/10)   Best (/10) 3   Worst (/10) 7   Pain quality A. Sharp;B. Dull;C. Aching   Frequency of pain/symptoms A. Constant   Pain/symptoms are: The same all the time   Pain/symptoms exacerbated by A. Sitting;B. Walking;G. Certain positions   Pain/symptoms eased by I. OTC medication(s)   Progression of symptoms since onset: Worsened   Prior Level of Function   Prior Level of Function-Mobility Independent   Prior Level of Function-ADLs Independent   Current Level of  Function   Current Community Support Family/friend caregiver   Patient role/employment history A. Employed   Employment Comments [employer]   Fall Risk Screen   Fall screen completed by PT   Have you fallen 2 or more times in the past year? No   Have you fallen and had an injury in the past year? No   Is patient a fall risk? No   Abuse Screen (yes response referral indicated)   Feels Unsafe at Home or Work/School no   Feels Threatened by Someone no   Does Anyone Try to Keep You From Having Contact with Others or Doing Things Outside Your Home? no   Physical Signs of Abuse Present no   Hip Objective Findings   Side (if bilateral, select both right and left) Right   Gait/Locomotion right compensated trendelenburg   Right Hip Flexion PROM 120 deg darrell   Right Hip ER PROM right=30; left=45   Right Hip IR PROM darrell=20   Right Hip Flexion Strength 5/5   Right Hip Abduction Strength right=3+/5; left=4/5   Right Hip Extension Strength righ=4/5; left=5/5   Obers/ITB Flexibility taught right   Right Piriformis Flexibility limited right > left   Planned Therapy Interventions   Planned Therapy Interventions ADL retraining;balance training;gait training;joint mobilization;manual therapy;motor coordination training;ROM;strengthening;stretching   Planned Modality Interventions   Planned Modality Interventions Cryotherapy   Clinical Impression   Criteria for Skilled Therapeutic Interventions Met yes, treatment indicated   PT Diagnosis right trochanteric bursitis due to glute med weakness, piriformis tightness   Influenced by the following impairments pain; weakness; ROM loss; impaired gait   Functional limitations due to impairments difficulty walking, working out, prolonged standing   Clinical Presentation Stable/Uncomplicated   Clinical Presentation Rationale (+) motivation; overall health and strength   Clinical Decision Making (Complexity) Low complexity   Therapy Frequency other (see  comments)   Predicted Duration of Therapy Intervention (days/wks) 1x every 3-4 weeks   Risk & Benefits of therapy have been explained Yes   Patient, Family & other staff in agreement with plan of care Yes   Clinical Impression Comments Thais arrives today with insidious onset right lateral hip pain presenting as trochanteric bursitis with piriformis and hip tightness.  She will benefit from skilled PT to address the above impairments and functional limitations.   Education Assessment   Preferred Learning Style Listening;Demonstration;Pictures/video   Barriers to Learning No barriers   ORTHO GOALS   PT Ortho Eval Goals 1;2;3   Ortho Goal 1   Goal Description Patient will have 5/5 right hip abduction strength to stabilize the hip frontal plane.   Target Date 09/23/21   Ortho Goal 2   Goal Description Patient will be able to stand 30 min without right hip pain.   Target Date 09/23/21   Ortho Goal 3   Goal Description Patient will be independent with her HEP to reduce future occurrence of pain and disability.   Target Date 08/05/21   Total Evaluation Time   PT Tierra, Low Complexity Minutes (69527) 20           Li Cano, PT, DTP, [facility]  Doctor of Physical Therapy #[id number]  [facility]  [phone]  [email or web address]

## 2021-08-05 ENCOUNTER — HOSPITAL ENCOUNTER (OUTPATIENT)
Dept: PHYSICAL THERAPY | Facility: CLINIC | Age: 61
Setting detail: THERAPIES SERIES
End: 2021-08-05
Attending: ORTHOPAEDIC SURGERY
Payer: COMMERCIAL

## 2021-08-05 PROCEDURE — 97140 MANUAL THERAPY 1/> REGIONS: CPT | Mod: GP | Performed by: PHYSICAL THERAPIST

## 2021-08-05 PROCEDURE — 97110 THERAPEUTIC EXERCISES: CPT | Mod: GP | Performed by: PHYSICAL THERAPIST

## 2021-08-10 ENCOUNTER — TRANSFERRED RECORDS (OUTPATIENT)
Dept: HEALTH INFORMATION MANAGEMENT | Facility: CLINIC | Age: 61
End: 2021-08-10

## 2021-12-28 ENCOUNTER — TRANSFERRED RECORDS (OUTPATIENT)
Dept: HEALTH INFORMATION MANAGEMENT | Facility: CLINIC | Age: 61
End: 2021-12-28
Payer: COMMERCIAL

## 2022-03-31 ENCOUNTER — TRANSFERRED RECORDS (OUTPATIENT)
Dept: HEALTH INFORMATION MANAGEMENT | Facility: CLINIC | Age: 62
End: 2022-03-31
Payer: COMMERCIAL

## 2022-07-18 NOTE — PROGRESS NOTES
[facility]    Outpatient Physical Therapy Discharge Note  Patient: Thais Castellon  : 1960    Beginning/End Dates of Reporting Period:  7/15/21 to 22    Referring Provider: Dr. Gonzalez    Therapy Diagnosis: right hip pain     Client Self Report: Doing HEP diligently and exercises are getting easier (hip is definitely getting stronger).  However, pain has not changed, needing daily ibuprofen at work and at night.  Hoping to discuss cortisone with MD.    Objective Measurements:  Objective Measure: right hip abduction  Details: 4+/5 progressed to gr theraband      Goals:  Goal Identifier     Goal Description Patient will have 5/5 right hip abduction strength to stabilize the hip frontal plane.   Target Date 21   Date Met      Progress (detail required for progress note):       Goal Identifier     Goal Description Patient will be able to stand 30 min without right hip pain.   Target Date 21   Date Met      Progress (detail required for progress note):       Goal Identifier     Goal Description Patient will be independent with her HEP to reduce future occurrence of pain and disability.   Target Date 21   Date Met      Progress (detail required for progress note):               Plan:  Discharge from therapy.    Discharge:    Reason for Discharge: No further expectation of progress.    Equipment Issued: none    Discharge Plan: Patient to continue home program.      Li Cano, PT, DTP, SCS  Doctor of Physical Therapy #[id number]  [facility]  [phone]  [email or web address]

## 2022-08-01 ENCOUNTER — TRANSFERRED RECORDS (OUTPATIENT)
Dept: FAMILY MEDICINE | Facility: CLINIC | Age: 62
End: 2022-08-01

## 2022-08-02 ENCOUNTER — TRANSCRIBE ORDERS (OUTPATIENT)
Dept: OTHER | Age: 62
End: 2022-08-02

## 2022-08-02 DIAGNOSIS — M76.60 INSERTIONAL ACHILLES TENDINOPATHY: Primary | ICD-10-CM

## 2022-08-10 ENCOUNTER — HOSPITAL ENCOUNTER (OUTPATIENT)
Dept: PHYSICAL THERAPY | Facility: CLINIC | Age: 62
Setting detail: THERAPIES SERIES
Discharge: HOME | End: 2022-08-10
Attending: PODIATRIST
Payer: COMMERCIAL

## 2022-08-10 DIAGNOSIS — M76.60 INSERTIONAL ACHILLES TENDINOPATHY: ICD-10-CM

## 2022-08-10 PROCEDURE — 97161 PT EVAL LOW COMPLEX 20 MIN: CPT | Mod: GP | Performed by: PHYSICAL THERAPIST

## 2022-08-10 PROCEDURE — 97033 APP MDLTY 1+IONTPHRSIS EA 15: CPT | Mod: GP | Performed by: PHYSICAL THERAPIST

## 2022-08-10 PROCEDURE — 97110 THERAPEUTIC EXERCISES: CPT | Mod: GP | Performed by: PHYSICAL THERAPIST

## 2022-08-10 NOTE — PROGRESS NOTES
08/10/22 1500   General Information   Type of Visit Initial OP Ortho PT Evaluation   Start of Care Date 08/10/22   Referring Physician Shivam Andrade DPM   Patient/Family Goals Statement To reduce the swelling in my heel;  avoid surgery   Orders Evaluate and Treat   Orders Comment modalities as needed:  HEP   Date of Order 08/01/22   Medical Diagnosis L insertional achilles tendinitis   Body Part(s)   Body Part(s) Ankle/Foot   Presentation and Etiology   Pertinent history of current problem (include personal factors and/or comorbidities that impact the POC) Pt presents w/ L achilles pain and swelling X 4 months.  No specific injury.  Intermittent pain 6/10, notes she will limp at times.  Xray: pt reports bone spur.  Meds:  advil/ tylenol.  Pt uses ice.    Pt reports she received heel lifts --has not started using yet.   PMHX: plantarfascitis,  fx L 5th toe and 1st toe fx,  L TKA,  Lumbar laminectomy/ decompression,  R CMC repair.   Impairments A. Pain;B. Decreased WB tolerance;C. Swelling;E. Decreased flexibility   Pain quality A. Sharp;C. Aching   Pain exacerbation comment touch/ bump it or pressure.  Walking 15-20 min.   Pain/symptoms eased by A. Sitting;H. Cold;I. OTC medication(s)   Progression of symptoms since onset: Improved  (w/ icing)   Prior Level of Function   Functional Level Prior Comment Walks daily 30 minutes,  Fit ex program: able to do currently   Current Level of Function   Patient role/employment history A. Employed   Employment Comments [employer]:  no limitations   Fall Risk Screen   Fall screen completed by PT   Have you fallen 2 or more times in the past year? No   Have you fallen and had an injury in the past year? No   Is patient a fall risk? No   Abuse Screen (yes response referral indicated)   Feels Unsafe at Home or Work/School no   Feels Threatened by Someone no   Does Anyone Try to Keep You From Having Contact with Others or Doing Things Outside Your Home? no   Physical Signs  of Abuse Present no   Ankle/Foot Objective Findings   Side (if bilateral, select both right and left) Left   Ankle/Foot ROM Comment B ankle AROM WNL   Palpation increased thickness at achilles insertion.  Mild tenderness medial aspect of L achilles tendon;  no calf pain/ tightness   Gait/Locomotion good pace/ no limp   Foot Position In Standing mild increased pronation   Ankle/Foot Strength Comments Hip abd R 5/5,  L 4+/5;  ankle DF 5/5 B   Left Gastroc (in WB) Flexibility 30* B   Left Soleus (in WB) Flexibility R 33*, L 31*   Left PF/Inversion Strength 5/5 B   Left PF/Eversion Strength 5/5 B   Left DF/Inversion Strength 5/5 B   Left DF/Eversion Strength 5/5 B   Planned Therapy Interventions   Planned Therapy Interventions manual therapy;strengthening;stretching;neuromuscular re-education   Planned Modality Interventions   Planned Modality Interventions Iontophoresis;Ultrasound   Clinical Impression   Criteria for Skilled Therapeutic Interventions Met yes, treatment indicated   PT Diagnosis achilles tendinitis   Influenced by the following impairments pain, swelling, stiffness   Functional limitations due to impairments walking, putting pressure on it   Clinical Presentation Stable/Uncomplicated   Clinical Presentation Rationale no recent changes   Clinical Decision Making (Complexity) Low complexity   Therapy Frequency 2 times/Week   Predicted Duration of Therapy Intervention (days/wks) 4 weeks   Risk & Benefits of therapy have been explained Yes   Patient, Family & other staff in agreement with plan of care Yes   Education Assessment   Preferred Learning Style Listening;Reading;Demonstration;Pictures/video   Barriers to Learning No barriers   ORTHO GOALS   PT Ortho Eval Goals 1;2   Ortho Goal 1   Goal Identifier 1.   Goal Description Pt will be able to walk 30 min w/ pain no > 3/10   Target Date 09/19/22   Ortho Goal 2   Goal Identifier 2.   Goal Description Pt will be independent and consistent w/HEP to manage  symptoms   Target Date 09/19/22   Total Evaluation Time   PT Tierra, Low Complexity Minutes (84001) 20     Thank you for this referral,    Dianne Schmidt, PT,   #[id number]  [facility]  [phone]

## 2022-08-15 ENCOUNTER — HOSPITAL ENCOUNTER (OUTPATIENT)
Dept: PHYSICAL THERAPY | Facility: CLINIC | Age: 62
Setting detail: THERAPIES SERIES
Discharge: HOME | End: 2022-08-15
Attending: PODIATRIST
Payer: COMMERCIAL

## 2022-08-15 PROCEDURE — 97033 APP MDLTY 1+IONTPHRSIS EA 15: CPT | Mod: GP | Performed by: PHYSICAL THERAPIST

## 2022-08-23 ENCOUNTER — HOSPITAL ENCOUNTER (OUTPATIENT)
Dept: PHYSICAL THERAPY | Facility: CLINIC | Age: 62
Setting detail: THERAPIES SERIES
Discharge: HOME | End: 2022-08-23
Attending: PODIATRIST
Payer: COMMERCIAL

## 2022-08-23 PROCEDURE — 97140 MANUAL THERAPY 1/> REGIONS: CPT | Mod: GP | Performed by: PHYSICAL THERAPIST

## 2022-08-23 PROCEDURE — 97033 APP MDLTY 1+IONTPHRSIS EA 15: CPT | Mod: GP | Performed by: PHYSICAL THERAPIST

## 2022-08-25 ENCOUNTER — HOSPITAL ENCOUNTER (OUTPATIENT)
Dept: PHYSICAL THERAPY | Facility: CLINIC | Age: 62
Setting detail: THERAPIES SERIES
Discharge: HOME | End: 2022-08-25
Attending: PODIATRIST
Payer: COMMERCIAL

## 2022-08-25 PROCEDURE — 97033 APP MDLTY 1+IONTPHRSIS EA 15: CPT | Mod: GP | Performed by: PHYSICAL THERAPIST

## 2022-08-25 PROCEDURE — 97140 MANUAL THERAPY 1/> REGIONS: CPT | Mod: GP | Performed by: PHYSICAL THERAPIST

## 2022-08-30 ENCOUNTER — HOSPITAL ENCOUNTER (OUTPATIENT)
Dept: PHYSICAL THERAPY | Facility: CLINIC | Age: 62
Setting detail: THERAPIES SERIES
Discharge: HOME | End: 2022-08-30
Attending: PODIATRIST
Payer: COMMERCIAL

## 2022-08-30 PROCEDURE — 97033 APP MDLTY 1+IONTPHRSIS EA 15: CPT | Mod: GP | Performed by: PHYSICAL THERAPIST

## 2022-08-30 PROCEDURE — 97140 MANUAL THERAPY 1/> REGIONS: CPT | Mod: GP | Performed by: PHYSICAL THERAPIST

## 2022-09-01 ENCOUNTER — HOSPITAL ENCOUNTER (OUTPATIENT)
Dept: PHYSICAL THERAPY | Facility: CLINIC | Age: 62
Setting detail: THERAPIES SERIES
Discharge: HOME | End: 2022-09-01
Attending: PODIATRIST
Payer: COMMERCIAL

## 2022-09-01 PROCEDURE — 97140 MANUAL THERAPY 1/> REGIONS: CPT | Mod: GP | Performed by: PHYSICAL THERAPIST

## 2022-09-01 PROCEDURE — 97033 APP MDLTY 1+IONTPHRSIS EA 15: CPT | Mod: GP | Performed by: PHYSICAL THERAPIST

## 2022-09-06 ENCOUNTER — HOSPITAL ENCOUNTER (OUTPATIENT)
Dept: PHYSICAL THERAPY | Facility: CLINIC | Age: 62
Setting detail: THERAPIES SERIES
Discharge: HOME | End: 2022-09-06
Attending: PODIATRIST
Payer: COMMERCIAL

## 2022-09-06 PROCEDURE — 97140 MANUAL THERAPY 1/> REGIONS: CPT | Mod: GP | Performed by: PHYSICAL THERAPIST

## 2022-09-06 PROCEDURE — 97033 APP MDLTY 1+IONTPHRSIS EA 15: CPT | Mod: GP | Performed by: PHYSICAL THERAPIST

## 2022-09-08 ENCOUNTER — HOSPITAL ENCOUNTER (OUTPATIENT)
Dept: PHYSICAL THERAPY | Facility: CLINIC | Age: 62
Setting detail: THERAPIES SERIES
Discharge: HOME | End: 2022-09-08
Attending: PODIATRIST
Payer: COMMERCIAL

## 2022-09-08 PROCEDURE — 97140 MANUAL THERAPY 1/> REGIONS: CPT | Mod: GP | Performed by: PHYSICAL THERAPIST

## 2022-09-08 PROCEDURE — 97110 THERAPEUTIC EXERCISES: CPT | Mod: GP | Performed by: PHYSICAL THERAPIST

## 2022-09-12 ENCOUNTER — TRANSFERRED RECORDS (OUTPATIENT)
Dept: FAMILY MEDICINE | Facility: CLINIC | Age: 62
End: 2022-09-12

## 2022-09-13 ENCOUNTER — HOSPITAL ENCOUNTER (OUTPATIENT)
Dept: PHYSICAL THERAPY | Facility: CLINIC | Age: 62
Setting detail: THERAPIES SERIES
Discharge: HOME | End: 2022-09-13
Attending: PODIATRIST
Payer: COMMERCIAL

## 2022-09-13 ENCOUNTER — TRANSCRIBE ORDERS (OUTPATIENT)
Dept: OTHER | Age: 62
End: 2022-09-13

## 2022-09-13 PROCEDURE — 97033 APP MDLTY 1+IONTPHRSIS EA 15: CPT | Mod: GP | Performed by: PHYSICAL THERAPIST

## 2022-09-13 PROCEDURE — 97140 MANUAL THERAPY 1/> REGIONS: CPT | Mod: GP | Performed by: PHYSICAL THERAPIST

## 2022-09-13 NOTE — PROGRESS NOTES
OUTPATIENT PHYSICAL THERAPY PROGRESS NOTE   Shivam Andrade DPM 8/10/22 to 09/13/22 1600   Signing Clinician's Name / Credentials   Signing clinician's name / credentials Dianne Schmidt, BAUDILIO   Session Number   Session Number 9   Adult Goals   PT Ortho Eval Goals 1;2;3   Ortho Goal 1   Goal Identifier 1.   Goal Description Pt will be able to walk 30 min w/ pain no > 3/10   Goal Progress 8/30/22 2/10.  9/13/22 1/10   Target Date 09/19/22   Date Met 09/13/22   Ortho Goal 2   Goal Identifier 2.   Goal Description Pt will be independent and consistent w/HEP to manage symptoms   Target Date 09/19/22   Date Met 09/13/22   Ortho Goal 3   Goal Identifier 3.   Goal Description Pt will be able to walk initially after sitting w/o limping   Target Date 10/28/22   Subjective Report   Subjective Report Pt saw MD 9/12/22--pt to cont w/ therapy.  Pt notes she is sore from MD palpation.    pain level today 1/10.   Objective Measures    Mod to severe tenderness L medial achilles--ongoing mild swelling    no limp , walks at fast pace   Treatment Interventions   Manual Therapy   Treatment Detail Tool assisted STM (bradley gottlieb tool) to gastroc/ achilles prone   Plan   Home program self massage, taping and ex as above   Plan cont 1-2X/wk begin weaning from ionto, may use US.  cont w/ progressive ex.   Comments   Comments Pt met goals 1 and 2;  goal 3 added today

## 2022-09-20 ENCOUNTER — HOSPITAL ENCOUNTER (OUTPATIENT)
Dept: PHYSICAL THERAPY | Facility: CLINIC | Age: 62
Setting detail: THERAPIES SERIES
Discharge: HOME | End: 2022-09-20
Attending: PODIATRIST
Payer: COMMERCIAL

## 2022-09-20 PROCEDURE — 97035 APP MDLTY 1+ULTRASOUND EA 15: CPT | Mod: GP | Performed by: PHYSICAL THERAPIST

## 2022-09-20 PROCEDURE — 97140 MANUAL THERAPY 1/> REGIONS: CPT | Mod: GP | Performed by: PHYSICAL THERAPIST

## 2022-09-27 ENCOUNTER — HOSPITAL ENCOUNTER (OUTPATIENT)
Dept: PHYSICAL THERAPY | Facility: CLINIC | Age: 62
Setting detail: THERAPIES SERIES
Discharge: HOME | End: 2022-09-27
Attending: PODIATRIST
Payer: COMMERCIAL

## 2022-09-27 PROCEDURE — 97033 APP MDLTY 1+IONTPHRSIS EA 15: CPT | Mod: GP | Performed by: PHYSICAL THERAPIST

## 2022-09-27 PROCEDURE — 97140 MANUAL THERAPY 1/> REGIONS: CPT | Mod: GP | Performed by: PHYSICAL THERAPIST

## 2022-09-29 ENCOUNTER — HOSPITAL ENCOUNTER (OUTPATIENT)
Dept: PHYSICAL THERAPY | Facility: CLINIC | Age: 62
Setting detail: THERAPIES SERIES
Discharge: HOME | End: 2022-09-29
Attending: PODIATRIST
Payer: COMMERCIAL

## 2022-09-29 PROCEDURE — 97033 APP MDLTY 1+IONTPHRSIS EA 15: CPT | Mod: GP | Performed by: PHYSICAL THERAPIST

## 2022-09-29 PROCEDURE — 97140 MANUAL THERAPY 1/> REGIONS: CPT | Mod: GP | Performed by: PHYSICAL THERAPIST

## 2022-10-04 ENCOUNTER — HOSPITAL ENCOUNTER (OUTPATIENT)
Dept: PHYSICAL THERAPY | Facility: CLINIC | Age: 62
Setting detail: THERAPIES SERIES
Discharge: HOME | End: 2022-10-04
Attending: PODIATRIST
Payer: COMMERCIAL

## 2022-10-04 PROCEDURE — 97140 MANUAL THERAPY 1/> REGIONS: CPT | Mod: GP | Performed by: PHYSICAL THERAPIST

## 2022-10-04 PROCEDURE — 97033 APP MDLTY 1+IONTPHRSIS EA 15: CPT | Mod: GP | Performed by: PHYSICAL THERAPIST

## 2022-10-06 ENCOUNTER — HOSPITAL ENCOUNTER (OUTPATIENT)
Dept: PHYSICAL THERAPY | Facility: CLINIC | Age: 62
Setting detail: THERAPIES SERIES
Discharge: HOME | End: 2022-10-06
Attending: PODIATRIST
Payer: COMMERCIAL

## 2022-10-06 PROCEDURE — 97140 MANUAL THERAPY 1/> REGIONS: CPT | Mod: GP | Performed by: PHYSICAL THERAPIST

## 2022-10-06 PROCEDURE — 97033 APP MDLTY 1+IONTPHRSIS EA 15: CPT | Mod: GP | Performed by: PHYSICAL THERAPIST

## 2022-10-11 ENCOUNTER — HOSPITAL ENCOUNTER (OUTPATIENT)
Dept: PHYSICAL THERAPY | Facility: CLINIC | Age: 62
Setting detail: THERAPIES SERIES
Discharge: HOME | End: 2022-10-11
Attending: PODIATRIST
Payer: COMMERCIAL

## 2022-10-11 PROCEDURE — 97140 MANUAL THERAPY 1/> REGIONS: CPT | Mod: GP | Performed by: PHYSICAL THERAPIST

## 2022-10-11 PROCEDURE — 97035 APP MDLTY 1+ULTRASOUND EA 15: CPT | Mod: GP | Performed by: PHYSICAL THERAPIST

## 2022-10-13 ENCOUNTER — HOSPITAL ENCOUNTER (OUTPATIENT)
Dept: PHYSICAL THERAPY | Facility: CLINIC | Age: 62
Setting detail: THERAPIES SERIES
Discharge: HOME | End: 2022-10-13
Attending: PODIATRIST
Payer: COMMERCIAL

## 2022-10-13 PROCEDURE — 97140 MANUAL THERAPY 1/> REGIONS: CPT | Mod: GP | Performed by: PHYSICAL THERAPIST

## 2022-10-13 PROCEDURE — 97033 APP MDLTY 1+IONTPHRSIS EA 15: CPT | Mod: GP | Performed by: PHYSICAL THERAPIST

## 2022-10-20 ENCOUNTER — HOSPITAL ENCOUNTER (OUTPATIENT)
Dept: PHYSICAL THERAPY | Facility: CLINIC | Age: 62
Setting detail: THERAPIES SERIES
Discharge: HOME | End: 2022-10-20
Attending: PODIATRIST
Payer: COMMERCIAL

## 2022-10-20 PROCEDURE — 97140 MANUAL THERAPY 1/> REGIONS: CPT | Mod: GP | Performed by: PHYSICAL THERAPIST

## 2022-10-20 PROCEDURE — 97033 APP MDLTY 1+IONTPHRSIS EA 15: CPT | Mod: GP | Performed by: PHYSICAL THERAPIST

## 2022-10-27 ENCOUNTER — HOSPITAL ENCOUNTER (OUTPATIENT)
Dept: PHYSICAL THERAPY | Facility: CLINIC | Age: 62
Setting detail: THERAPIES SERIES
Discharge: HOME | End: 2022-10-27
Attending: PODIATRIST
Payer: COMMERCIAL

## 2022-10-27 PROCEDURE — 97033 APP MDLTY 1+IONTPHRSIS EA 15: CPT | Mod: GP | Performed by: PHYSICAL THERAPIST

## 2022-10-27 PROCEDURE — 97140 MANUAL THERAPY 1/> REGIONS: CPT | Mod: GP | Performed by: PHYSICAL THERAPIST

## 2022-11-03 ENCOUNTER — HOSPITAL ENCOUNTER (OUTPATIENT)
Dept: PHYSICAL THERAPY | Facility: CLINIC | Age: 62
Setting detail: THERAPIES SERIES
Discharge: HOME | End: 2022-11-03
Attending: PODIATRIST
Payer: COMMERCIAL

## 2022-11-03 PROCEDURE — 97140 MANUAL THERAPY 1/> REGIONS: CPT | Mod: GP | Performed by: PHYSICAL THERAPIST

## 2022-11-03 PROCEDURE — 97033 APP MDLTY 1+IONTPHRSIS EA 15: CPT | Mod: GP | Performed by: PHYSICAL THERAPIST

## 2022-11-10 ENCOUNTER — HOSPITAL ENCOUNTER (OUTPATIENT)
Dept: PHYSICAL THERAPY | Facility: CLINIC | Age: 62
Setting detail: THERAPIES SERIES
Discharge: HOME | End: 2022-11-10
Attending: PODIATRIST
Payer: COMMERCIAL

## 2022-11-10 PROCEDURE — 97140 MANUAL THERAPY 1/> REGIONS: CPT | Mod: GP | Performed by: PHYSICAL THERAPIST

## 2022-11-10 PROCEDURE — 97033 APP MDLTY 1+IONTPHRSIS EA 15: CPT | Mod: GP | Performed by: PHYSICAL THERAPIST

## 2022-11-23 ENCOUNTER — TRANSFERRED RECORDS (OUTPATIENT)
Dept: HEALTH INFORMATION MANAGEMENT | Facility: CLINIC | Age: 62
End: 2022-11-23

## 2022-12-08 NOTE — PROGRESS NOTES
[facility]    Outpatient Physical Therapy Progress Note  Patient: Thais Castellon  : 1960    Beginning/End Dates of Reporting Period:  8/10/22 to 11/10/22    Referring Provider: Dr. Andrade    Therapy Diagnosis: L insertional achilles tendinitis     Client Self Report: Overall, symptoms continue to be the same. Was really sore last night, unsure why. Will see Dr. Andrade on .    Objective Measurements:  Objective Measure: Mod tenderness L medial achilles        Goals:  Goal Identifier 1.   Goal Description Pt will be able to walk 30 min w/ pain no > 3/10   Target Date 22   Date Met  22   Progress (detail required for progress note): 8/30/22 2/10.  9/13/22 1/10     Goal Identifier 2.   Goal Description Pt will be independent and consistent w/HEP to manage symptoms   Target Date 22   Date Met  22   Progress (detail required for progress note):       Goal Identifier 3.   Goal Description Pt will be able to walk initially after sitting w/o limping   Target Date 10/28/22   Date Met      Progress (detail required for progress note): sx most first in AM or after sitting     Overall improvement since initiating therapy but progress has plateaued.     Plan:  Hold chart pending doctor visit    Discharge:  No    Please contact me with any questions or concerns.  Thank you for your referral.    Eva Ambriz, PT, DPT, OCS  Physical Therapist, Orthopedic Certified Specialist    Formerly [facility]  [address] [address]  [email or web address]   Office: [phone]   Employed by [facility] [employer]

## 2022-12-19 ENCOUNTER — OFFICE VISIT (OUTPATIENT)
Dept: FAMILY MEDICINE | Facility: CLINIC | Age: 62
End: 2022-12-19
Payer: COMMERCIAL

## 2022-12-19 VITALS
HEART RATE: 79 BPM | SYSTOLIC BLOOD PRESSURE: 112 MMHG | TEMPERATURE: 97.7 F | DIASTOLIC BLOOD PRESSURE: 84 MMHG | BODY MASS INDEX: 21.43 KG/M2 | WEIGHT: 141.4 LBS | OXYGEN SATURATION: 96 % | HEIGHT: 68 IN | RESPIRATION RATE: 20 BRPM

## 2022-12-19 DIAGNOSIS — M77.32 EXOSTOSIS OF LEFT POSTERIOR CALCANEUS: ICD-10-CM

## 2022-12-19 DIAGNOSIS — D50.0 IRON DEFICIENCY ANEMIA DUE TO CHRONIC BLOOD LOSS: ICD-10-CM

## 2022-12-19 DIAGNOSIS — Z11.4 SCREENING FOR HIV (HUMAN IMMUNODEFICIENCY VIRUS): ICD-10-CM

## 2022-12-19 DIAGNOSIS — Z00.00 HEALTHCARE MAINTENANCE: ICD-10-CM

## 2022-12-19 DIAGNOSIS — Z01.818 PREOP GENERAL PHYSICAL EXAM: Primary | ICD-10-CM

## 2022-12-19 DIAGNOSIS — Z12.31 VISIT FOR SCREENING MAMMOGRAM: ICD-10-CM

## 2022-12-19 DIAGNOSIS — Z13.1 SCREENING FOR DIABETES MELLITUS: ICD-10-CM

## 2022-12-19 DIAGNOSIS — Z12.11 SCREEN FOR COLON CANCER: ICD-10-CM

## 2022-12-19 DIAGNOSIS — Z13.220 SCREENING FOR HYPERLIPIDEMIA: ICD-10-CM

## 2022-12-19 DIAGNOSIS — Z11.59 NEED FOR HEPATITIS C SCREENING TEST: ICD-10-CM

## 2022-12-19 LAB
ANION GAP SERPL CALCULATED.3IONS-SCNC: 13 MMOL/L (ref 7–15)
BUN SERPL-MCNC: 13.8 MG/DL (ref 8–23)
CALCIUM SERPL-MCNC: 9.9 MG/DL (ref 8.8–10.2)
CHLORIDE SERPL-SCNC: 101 MMOL/L (ref 98–107)
CHOLEST SERPL-MCNC: 174 MG/DL
CREAT SERPL-MCNC: 0.7 MG/DL (ref 0.51–0.95)
DEPRECATED HCO3 PLAS-SCNC: 28 MMOL/L (ref 22–29)
ERYTHROCYTE [DISTWIDTH] IN BLOOD BY AUTOMATED COUNT: 11.6 % (ref 10–15)
GFR SERPL CREATININE-BSD FRML MDRD: >90 ML/MIN/1.73M2
GLUCOSE SERPL-MCNC: 95 MG/DL (ref 70–99)
HCT VFR BLD AUTO: 39.3 % (ref 35–47)
HCV AB SERPL QL IA: NONREACTIVE
HDLC SERPL-MCNC: 80 MG/DL
HGB BLD-MCNC: 13.4 G/DL (ref 11.7–15.7)
HIV 1+2 AB+HIV1 P24 AG SERPL QL IA: NONREACTIVE
LDLC SERPL CALC-MCNC: 74 MG/DL
MCH RBC QN AUTO: 32.8 PG (ref 26.5–33)
MCHC RBC AUTO-ENTMCNC: 34.1 G/DL (ref 31.5–36.5)
MCV RBC AUTO: 96 FL (ref 78–100)
NONHDLC SERPL-MCNC: 94 MG/DL
PLATELET # BLD AUTO: 192 10E3/UL (ref 150–450)
POTASSIUM SERPL-SCNC: 3.7 MMOL/L (ref 3.4–5.3)
RBC # BLD AUTO: 4.09 10E6/UL (ref 3.8–5.2)
SODIUM SERPL-SCNC: 142 MMOL/L (ref 136–145)
TRIGL SERPL-MCNC: 99 MG/DL
WBC # BLD AUTO: 3.5 10E3/UL (ref 4–11)

## 2022-12-19 PROCEDURE — 80048 BASIC METABOLIC PNL TOTAL CA: CPT | Performed by: FAMILY MEDICINE

## 2022-12-19 PROCEDURE — 86803 HEPATITIS C AB TEST: CPT | Performed by: FAMILY MEDICINE

## 2022-12-19 PROCEDURE — 36415 COLL VENOUS BLD VENIPUNCTURE: CPT | Performed by: FAMILY MEDICINE

## 2022-12-19 PROCEDURE — 99204 OFFICE O/P NEW MOD 45 MIN: CPT | Performed by: FAMILY MEDICINE

## 2022-12-19 PROCEDURE — 80061 LIPID PANEL: CPT | Performed by: FAMILY MEDICINE

## 2022-12-19 PROCEDURE — 87389 HIV-1 AG W/HIV-1&-2 AB AG IA: CPT | Performed by: FAMILY MEDICINE

## 2022-12-19 PROCEDURE — 85027 COMPLETE CBC AUTOMATED: CPT | Performed by: FAMILY MEDICINE

## 2022-12-19 ASSESSMENT — PAIN SCALES - GENERAL: PAINLEVEL: SEVERE PAIN (7)

## 2022-12-19 NOTE — PATIENT INSTRUCTIONS
- HOLD (do not take) NSAIDs for 5 days before surgery - ibuprofen, aspirin, naproxen  - Tylenol is safe to continue if needed for pain  - STOP taking all vitamins and herbal supplements 7 days before surgery.

## 2022-12-19 NOTE — LETTER
"December 20, 2022    Thais Castellon  [address] [place] [address]    Dear Thais,    Your test results fall within the expected range(s) or remain unchanged from previous results.  Please continue with current treatment plan.    Normal complete blood counts. The \"low\" WBC could be a sign of recovering from a viral illness or just be normal variation. It's not otherwise meaningful in the context of everything else OK.  Normal basic metabolic panel meaning normal electrolytes, blood sugar, and kidney function.  No hep C and no HIV. No need to repeat again unless exposure concerns arise.  Cholesterol is in a good range.     Resulted Orders   CBC with platelets   Result Value Ref Range    WBC Count 3.5 (L) 4.0 - 11.0 10e3/uL    RBC Count 4.09 3.80 - 5.20 10e6/uL    Hemoglobin 13.4 11.7 - 15.7 g/dL    Hematocrit 39.3 35.0 - 47.0 %    MCV 96 78 - 100 fL    MCH 32.8 26.5 - 33.0 pg    MCHC 34.1 31.5 - 36.5 g/dL    RDW 11.6 10.0 - 15.0 %    Platelet Count 192 150 - 450 10e3/uL   Basic metabolic panel  (Ca, Cl, CO2, Creat, Gluc, K, Na, BUN)   Result Value Ref Range    Sodium 142 136 - 145 mmol/L    Potassium 3.7 3.4 - 5.3 mmol/L    Chloride 101 98 - 107 mmol/L    Carbon Dioxide (CO2) 28 22 - 29 mmol/L    Anion Gap 13 7 - 15 mmol/L    Urea Nitrogen 13.8 8.0 - 23.0 mg/dL    Creatinine 0.70 0.51 - 0.95 mg/dL    Calcium 9.9 8.8 - 10.2 mg/dL    Glucose 95 70 - 99 mg/dL    GFR Estimate >90 >60 mL/min/1.73m2      Comment:      Effective December 21, 2021 eGFRcr in adults is calculated using the 2021 CKD-EPI creatinine equation which includes age and gender (Ki sanchez al., NEJM, DOI: 10.1056/PKLWwj7820025)   Lipid panel reflex to direct LDL Non-fasting   Result Value Ref Range    Cholesterol 174 <200 mg/dL    Triglycerides 99 <150 mg/dL    Direct Measure HDL 80 >=50 mg/dL    LDL Cholesterol Calculated 74 <=100 mg/dL    Non HDL Cholesterol 94 <130 mg/dL    Narrative    Cholesterol  Desirable:  <200 " mg/dL    Triglycerides  Normal:  Less than 150 mg/dL  Borderline High:  150-199 mg/dL  High:  200-499 mg/dL  Very High:  Greater than or equal to 500 mg/dL    Direct Measure HDL  Female:  Greater than or equal to 50 mg/dL   Male:  Greater than or equal to 40 mg/dL    LDL Cholesterol  Desirable:  <100mg/dL  Above Desirable:  100-129 mg/dL   Borderline High:  130-159 mg/dL   High:  160-189 mg/dL   Very High:  >= 190 mg/dL    Non HDL Cholesterol  Desirable:  130 mg/dL  Above Desirable:  130-159 mg/dL  Borderline High:  160-189 mg/dL  High:  190-219 mg/dL  Very High:  Greater than or equal to 220 mg/dL   Hepatitis C Screen Reflex to HCV RNA Quant and Genotype   Result Value Ref Range    Hepatitis C Antibody Nonreactive Nonreactive    Narrative    Assay performance characteristics have not been established for newborns, infants, and children.   HIV Antigen Antibody Combo   Result Value Ref Range    HIV Antigen Antibody Combo Nonreactive Nonreactive      Comment:      HIV-1 p24 Ag & HIV-1/HIV-2 Ab Not Detected     If you have any questions or concerns, please call the clinic at the number listed above.       Sincerely,  Andressa Dukes MD

## 2022-12-19 NOTE — PROGRESS NOTES
[facility]  [address] [address]  Phone: [phone]  Primary Provider: Karrie Infante  Pre-op Performing Provider: KENYON ANDRE    PREOPERATIVE EVALUATION:  Today's date: 12/19/2022    Thais Castellon is a 62 year old female who presents for a preoperative evaluation.    Surgical Information:  Surgery/Procedure: Exostectomy Posterior Heel and Achilles Tendon Deloridement  Surgery Location: [facility]  Surgeon: Dr. Shivam Andrade  Surgery Date: 1/13/23  Time of Surgery: TBD  Where patient plans to recover: At home with family  Fax number for surgical facility: [phone]    Type of Anesthesia Anticipated: to be determined    Assessment & Plan     The proposed surgical procedure is considered INTERMEDIATE risk.    Preop general physical exam  Exostosis of L calcaneus  Indication for surgery    Risks and Recommendations:  The patient has the following additional risks and recommendations for perioperative complications:   - No identified additional risk factors other than previously addressed    Medication Instructions:  Patient is on no chronic medications  - HOLD (do not take) NSAIDs for 5 days before surgery - ibuprofen, aspirin, naproxen  - Tylenol is safe to continue if needed for pain  - STOP taking all vitamins and herbal supplements 7 days before surgery.     RECOMMENDATION:  APPROVAL GIVEN to proceed with proposed procedure, without further diagnostic evaluation.      The remainder of the orders below were done for health maintenance and discussed w/her for shared decision-making on getting them caught up  Healthcare maintenance  - REVIEW OF HEALTH MAINTENANCE PROTOCOL ORDERS    Screening for HIV (human immunodeficiency virus)  - HIV Antigen Antibody Combo  - HIV Antigen Antibody Combo    Need for hepatitis C screening test  - Hepatitis C Screen Reflex to HCV RNA Quant and Genotype  - Hepatitis C Screen Reflex to HCV RNA Quant and  Genotype    Screening for hyperlipidemia  - Lipid panel reflex to direct LDL Non-fasting  - Lipid panel reflex to direct LDL Non-fasting    Screen for colon cancer  - Colonoscopy Screening  Referral    Visit for screening mammogram  - MA SCREENING DIGITAL BILAT - Future  (s+30)    Screening for diabetes mellitus  - Basic metabolic panel  (Ca, Cl, CO2, Creat, Gluc, K, Na, BUN)  - Basic metabolic panel  (Ca, Cl, CO2, Creat, Gluc, K, Na, BUN)    Iron deficiency anemia due to chronic blood loss  - CBC with platelets  - CBC with platelets      Andressa Dukes MD  Family Medicine  [facility]            Subjective     HPI related to upcoming procedure: Exostosis of left heel and achilles tendonitis    Preop Questions 12/19/2022   1. Have you ever had a heart attack or stroke? No   2. Have you ever had surgery on your heart or blood vessels, such as a stent placement, a coronary artery bypass, or surgery on an artery in your head, neck, heart, or legs? No   3. Do you have chest pain with activity? No   4. Do you have a history of  heart failure? No   5. Do you currently have a cold, bronchitis or symptoms of other infection? No   6. Do you have a cough, shortness of breath, or wheezing? No   7. Do you or anyone in your family have previous history of blood clots? No   8. Do you or does anyone in your family have a serious bleeding problem such as prolonged bleeding following surgeries or cuts? No   9. Have you ever had problems with anemia or been told to take iron pills? YES - pre- hysterectomy about 20 years ago   10. Have you had any abnormal blood loss such as black, tarry or bloody stools, or abnormal vaginal bleeding? No   11. Have you ever had a blood transfusion? No   12. Are you willing to have a blood transfusion if it is medically needed before, during, or after your surgery? Yes   13. Have you or any of your relatives ever had problems with anesthesia? No   14. Do you have  sleep apnea, excessive snoring or daytime drowsiness? No   15. Do you have any artifical heart valves or other implanted medical devices like a pacemaker, defibrillator, or continuous glucose monitor? No   16. Do you have artificial joints? YES - left knee   17. Are you allergic to latex? No     Health Care Directive:  Patient does not have a Health Care Directive or Living Will: Discussed advance care planning with patient; information given to patient to review.    Preoperative Review of :   reviewed - no record of controlled substances prescribed.    Review of Systems  Constitutional, neuro, ENT, endocrine, pulmonary, cardiac, gastrointestinal, genitourinary, musculoskeletal, integument and psychiatric systems are negative, except as otherwise noted.    Patient Active Problem List    Diagnosis Date Noted     Sprain of MCL (medial collateral ligament) of knee 07/07/2014     Priority: Medium     6/14 after TKA. Conservative care       Status post total knee replacement 04/08/2014     Priority: Medium     Kyphosis of thoracolumbar region 01/20/2012     Priority: Medium     L Lumbosacral neuritis 01/20/2012     Priority: Medium     Failed gabapentin - tachycardia       Menopausal syndrome (hot flashes) 01/20/2012     Priority: Medium     Insomnia 01/20/2012     Priority: Medium     CARDIOVASCULAR SCREENING; LDL GOAL LESS THAN 160 10/31/2010     Priority: Medium     Left knee DJD 12/04/2009     Priority: Medium     Insomnia 06/30/2006     Priority: Medium     Problem list name updated by automated process. 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 hyst     Leiomyoma of uterus, unspecified     Hyst 2004     Ulnar  collateral ligament sprain(841.1)     Ulnar collateral ligament     Unspecified polyarthropathy or polyarthritis, multiple sites     Parvovirus infection 3/05     Past Surgical History:   Procedure Laterality Date     ARTHROPLASTY KNEE  4/7/2014    Procedure: ARTHROPLASTY KNEE;  Left Total Knee Arthroplasty;  Surgeon: Kaiden Arteaga MD;  Location: [facility]     ARTHROSCOPY KNEE RT/LT  age 11    Left knee     D & C  x 3    Missed Ab's     HC LAPAROSCOPY, SURGICAL, ABDOMEN, PERITONEUM & OMENTUM; DX W/ OR W/O SPECIMEN(S)  1994    Dx lap for infertility     HYSTERECTOMY, PAP NO LONGER INDICATED       HYSTERECTOMY, VAGINAL  5/2004     LAMINECTOMY LUMBAR ONE LEVEL  2/29/2012    Procedure:LAMINECTOMY LUMBAR ONE LEVEL; Left Lumbar 3-4 Foraminotomy (c-arm); Surgeon:BRYAN HANSEN; [facility]     ORTHOPEDIC SURGERY  9/7/2010    open repair left great toe laceration     SURGICAL HISTORY OF -   2009    Debridement of lateral meniscus, lateral compartment with debridement of the patellofemoral spur superior lateral patella.      SURGICAL HISTORY OF -   2010    Open wound, left great toe, possible laceration of the extensor tendon, left great toe.      C REVISE THUMB TENDON  5/2003    Reconstruct ulnar collateral ligament-Right     Current Outpatient Medications   Medication Sig Dispense Refill     amoxicillin-clavulanate (AUGMENTIN) 875-125 MG per tablet Take 1 tablet by mouth 2 times daily (Patient not taking: Reported on 1/28/2021) 28 tablet 0     augmented betamethasone dipropionate (DIPROLENE-AF) 0.05 % external cream Apply sparingly twice daily as needed for 1-2 weeks 50 g 0     desoximetasone (TOPICORT LP) 0.05 % external cream Apply to affected area once or twice daily for 1-2 weeks, then only apply as needed 100 g 1     fluconazole (DIFLUCAN) 150 MG tablet Take 1 tablet (150 mg) by mouth every 3 days (Patient not taking: Reported on 1/28/2021) 4 tablet 0     Multiple Vitamin (MULTI-VITAMIN) per tablet Take  "1 tablet by mouth daily.       trimethoprim-polymyxin b (POLYTRIM) ophthalmic solution Apply 1 drop to eye 4 times daily (Patient not taking: Reported on 1/28/2021) 1 Bottle 0     Allergies   Allergen Reactions     Azithromycin Blisters     Water blisters over entire body. Completed Z-pack, tolerated since taking Allegra,Benedryl.     Gabapentin Other (See Comments)     Tachycardia        Social History     Tobacco Use     Smoking status: Never     Smokeless tobacco: Never   Substance Use Topics     Alcohol use: Yes     Comment: 2 drinks per month     Family History   Problem Relation Age of Onset     Arthritis Mother         Rheumatoid     Hypertension Mother      Cancer Mother         basal cell on leg     Respiratory Brother         Asthma     Neurologic Disorder Paternal Grandfather         parkinsons     Respiratory Brother         asthma     Respiratory Son         asthma, allergies     Breast Cancer No family hx of      Cancer - colorectal No family hx of      History   Drug Use No         Objective     /84 (BP Location: Right arm, Patient Position: Sitting, Cuff Size: Adult Regular)   Pulse 79   Temp 97.7  F (36.5  C) (Tympanic)   Resp 20   Ht 1.727 m (5' 8\")   Wt 64.1 kg (141 lb 6.4 oz)   SpO2 96%   BMI 21.50 kg/m      Physical Exam    GENERAL APPEARANCE: healthy, alert and no distress     EYES: EOMI, PERRL     HENT: ear canals and TM's normal and nose and mouth without ulcers or lesions     NECK: no adenopathy, no asymmetry, masses, or scars and thyroid normal to palpation     RESP: lungs clear to auscultation - no rales, rhonchi or wheezes     CV: regular rates and rhythm, normal S1 S2, no S3 or S4 and no murmur, click or rub     ABDOMEN:  soft, nontender, no HSM or masses and bowel sounds normal     MS: extremities normal- no gross deformities noted, no evidence of inflammation in joints, FROM in all extremities.     SKIN: no suspicious lesions or rashes     NEURO: Normal strength and tone, " sensory exam grossly normal, mentation intact and speech normal     PSYCH: mentation appears normal. and affect normal/bright     LYMPHATICS: No cervical adenopathy    No results for input(s): HGB, PLT, INR, NA, POTASSIUM, CR, A1C in the last 07120 hours.     Diagnostics:  Labs pending at this time.  Results will be reviewed when available.    - She doesn't need them for pre-op but we did order for preventive purposes  No EKG required, no history of coronary heart disease, significant arrhythmia, peripheral arterial disease or other structural heart disease.    Revised Cardiac Risk Index (RCRI):  The patient has the following serious cardiovascular risks for perioperative complications:   - No serious cardiac risks = 0 points     RCRI Interpretation: 0 points: Class I (very low risk - 0.4% complication rate)         Signed Electronically by: Andressa Dkues MD  Copy of this evaluation report is provided to requesting physician.

## 2022-12-29 ENCOUNTER — HOSPITAL ENCOUNTER (OUTPATIENT)
Dept: PHYSICAL THERAPY | Facility: CLINIC | Age: 62
Setting detail: THERAPIES SERIES
Discharge: HOME | End: 2022-12-29
Attending: PODIATRIST
Payer: COMMERCIAL

## 2022-12-29 ENCOUNTER — TRANSCRIBE ORDERS (OUTPATIENT)
Dept: OTHER | Age: 62
End: 2022-12-29

## 2022-12-29 DIAGNOSIS — Z98.890 S/P FOOT SURGERY, LEFT: Primary | ICD-10-CM

## 2022-12-29 PROCEDURE — 97116 GAIT TRAINING THERAPY: CPT | Mod: GP | Performed by: PHYSICAL THERAPIST

## 2022-12-29 NOTE — PROGRESS NOTES
[facility]    Outpatient Physical Therapy Discharge Note  Patient: Thais Castellon  : 1960    Beginning/End Dates of Reporting Period:  8/10/22 to 22    Referring Provider: Dr. Andrade    Therapy Diagnosis: L insertional achilles tendonitis     Client Self Report: Surgery scheduled for . Will be non-weightbearing for a month, then partial WB in the boot. Here for crutch traininig today.    Objective Measurements:  Objective Measure: Mod tenderness L medial achilles           Goals:  Goal Identifier 1.   Goal Description Pt will be able to walk 30 min w/ pain no > 3/10   Target Date 22   Date Met  22   Progress (detail required for progress note): 8/30/22 2/10.  9/13/22 1/10     Goal Identifier 2.   Goal Description Pt will be independent and consistent w/HEP to manage symptoms   Target Date 22   Date Met  22   Progress (detail required for progress note):       Goal Identifier 3.   Goal Description Pt will be able to walk initially after sitting w/o limping   Target Date 10/28/22   Date Met      Progress (detail required for progress note): sx most first in AM or after sitting     Improvement since initiating physical therapy but progress has plateaued.       Plan:  Discharge from therapy.    Discharge:    Reason for Discharge: No further expectation of progress.    Equipment Issued: none    Discharge Plan: Other services: Patient to have surgery, scheduled 23.        Please contact me with any questions or concerns.  Thank you for your referral.    Eva Ambriz, PT, DPT, OCS  Physical Therapist, Orthopedic Certified Specialist    [facility]  [address] [place] [address]  [email or web address]   Office: [phone]   Employed by [facility]

## 2022-12-29 NOTE — PROGRESS NOTES
[facility]    Outpatient Physical Therapy Crutch Training   Patient: Thais Castellon  : 1960    Beginning/End Dates of Reporting Period:  22 to 22    Referring Provider: Dr. Andrade    Therapy Diagnosis: pre-op crutch training, non-weightbearing status post surgery     Client Self Report: Surgery scheduled for . Will be non-weightbearing for a month, then partial WB in the boot. Here for crutch traininig today.    Objective Measurements:  Crutches were fit to the correct height and correct  placements  Patient demonstrated independent  and safe non-WB, step through gait pattern, with B axillary crutches.   Patient demonstrated independent and safe non-WB stair climbing (with and without rail) with B axillary crutches           Assessment:  Patient independent and safe with non-WB pattern with B axillary crutches    Plan:  Discharge from therapy.         Please contact me with any questions or concerns.  Thank you for your referral.    Eva Ambriz, PT, DPT, OCS  Physical Therapist, Orthopedic Certified Specialist    [facility]  [address] [place] [address]  [email or web address]   Office: [phone]   Employed by [facility]

## 2023-01-13 ENCOUNTER — OFFICE VISIT (OUTPATIENT)
Dept: FAMILY MEDICINE | Facility: CLINIC | Age: 63
End: 2023-01-13
Payer: COMMERCIAL

## 2023-01-13 VITALS
RESPIRATION RATE: 14 BRPM | OXYGEN SATURATION: 96 % | DIASTOLIC BLOOD PRESSURE: 78 MMHG | TEMPERATURE: 98.1 F | BODY MASS INDEX: 21.37 KG/M2 | HEIGHT: 68 IN | HEART RATE: 71 BPM | WEIGHT: 141 LBS | SYSTOLIC BLOOD PRESSURE: 122 MMHG

## 2023-01-13 DIAGNOSIS — Z01.818 PREOP GENERAL PHYSICAL EXAM: Primary | ICD-10-CM

## 2023-01-13 DIAGNOSIS — M77.32 EXOSTOSIS OF LEFT POSTERIOR CALCANEUS: ICD-10-CM

## 2023-01-13 PROCEDURE — 99214 OFFICE O/P EST MOD 30 MIN: CPT | Performed by: FAMILY MEDICINE

## 2023-01-13 RX ORDER — ACETAMINOPHEN 500 MG
500-1000 TABLET ORAL EVERY 6 HOURS PRN
COMMUNITY

## 2023-01-13 ASSESSMENT — PAIN SCALES - GENERAL: PAINLEVEL: NO PAIN (0)

## 2023-01-13 NOTE — PROGRESS NOTES
[facility]  [address] [address]  Phone: [phone]  Primary Provider: Karrie Infante  Pre-op Performing Provider: KARRIE INFANTE    {  PREOPERATIVE EVALUATION:  Today's date: 1/13/2023    Thais Castellon is a 62 year old female who presents for a preoperative evaluation.    Surgical Information:  Surgery/Procedure: Left posterior heel exostectomy, left achilles tendo debridement  Surgery Location: [facility]  Surgeon: Raymond  Surgery Date: 1/19/2023  Time of Surgery: 11:00AM  Where patient plans to recover: At home with family  Fax number for surgical facility: Note does not need to be faxed, will be available electronically in Epic.    Type of Anesthesia Anticipated: General    Assessment & Plan     The proposed surgical procedure is considered LOW risk.    Preop general physical exam       Exostosis of left posterior calcaneus                Risks and Recommendations:  The patient has the following additional risks and recommendations for perioperative complications:   - No identified additional risk factors other than previously addressed    Medication Instructions:  Patient is on no chronic medications    RECOMMENDATION:  APPROVAL GIVEN to proceed with proposed procedure, without further diagnostic evaluation.            Subjective     HPI related to upcoming procedure: 61 yo female with left achilles tendinopathy due to a bone spur that has failed conservative management.      Preop Questions 1/13/2023   1. Have you ever had a heart attack or stroke? No   2. Have you ever had surgery on your heart or blood vessels, such as a stent placement, a coronary artery bypass, or surgery on an artery in your head, neck, heart, or legs? No   3. Do you have chest pain with activity? No   4. Do you have a history of  heart failure? No   5. Do you currently have a cold, bronchitis or symptoms of other infection? No   6. Do you have a cough, shortness of breath, or  wheezing? No   7. Do you or anyone in your family have previous history of blood clots? No   8. Do you or does anyone in your family have a serious bleeding problem such as prolonged bleeding following surgeries or cuts? No   9. Have you ever had problems with anemia or been told to take iron pills? No   10. Have you had any abnormal blood loss such as black, tarry or bloody stools, or abnormal vaginal bleeding? No   11. Have you ever had a blood transfusion? No   12. Are you willing to have a blood transfusion if it is medically needed before, during, or after your surgery? Yes   13. Have you or any of your relatives ever had problems with anesthesia? No   14. Do you have sleep apnea, excessive snoring or daytime drowsiness? No   15. Do you have any artifical heart valves or other implanted medical devices like a pacemaker, defibrillator, or continuous glucose monitor? No   16. Do you have artificial joints? YES - left knee   17. Are you allergic to latex? No       Health Care Directive:  Patient does not have a Health Care Directive or Living Will: Discussed advance care planning with patient; however, patient declined at this time.    Preoperative Review of :   reviewed - no record of controlled substances prescribed.      Status of Chronic Conditions:  See problem list for active medical problems.  Problems all longstanding and stable, except as noted/documented.  See ROS for pertinent symptoms related to these conditions.      Review of Systems  CONSTITUTIONAL: NEGATIVE for fever, chills, change in weight  ENT/MOUTH: NEGATIVE for ear, mouth and throat problems  RESP: NEGATIVE for significant cough or SOB  CV: NEGATIVE for chest pain, palpitations or peripheral edema    Patient Active Problem List    Diagnosis Date Noted     Sprain of MCL (medial collateral ligament) of knee 07/07/2014     Priority: Medium     6/14 after TKA. Conservative care       Status post total knee replacement 04/08/2014      Priority: Medium     Kyphosis of thoracolumbar region 01/20/2012     Priority: Medium     L Lumbosacral neuritis 01/20/2012     Priority: Medium     Failed gabapentin - tachycardia       Menopausal syndrome (hot flashes) 01/20/2012     Priority: Medium     Insomnia 01/20/2012     Priority: Medium     CARDIOVASCULAR SCREENING; LDL GOAL LESS THAN 160 10/31/2010     Priority: Medium     Left knee DJD 12/04/2009     Priority: Medium     Insomnia 06/30/2006     Priority: Medium     Problem list name updated by automated process. 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 hyst     Leiomyoma of uterus, unspecified     Hyst 2004     Ulnar collateral ligament sprain(841.1)     Ulnar collateral ligament     Unspecified polyarthropathy or polyarthritis, multiple sites     Parvovirus infection 3/05     Past Surgical History:   Procedure Laterality Date     ARTHROPLASTY KNEE  4/7/2014    Procedure: ARTHROPLASTY KNEE;  Left Total Knee Arthroplasty;  Surgeon: Kaiden Arteaga MD;  Location: [facility]     ARTHROSCOPY KNEE RT/LT  age 11    Left knee     D & C  x 3    Missed Ab's     HC LAPAROSCOPY, SURGICAL, ABDOMEN, PERITONEUM & OMENTUM; DX W/ OR W/O SPECIMEN(S)  1994    Dx lap for infertility     HYSTERECTOMY, PAP NO LONGER INDICATED       HYSTERECTOMY, VAGINAL  5/2004     LAMINECTOMY LUMBAR ONE LEVEL  2/29/2012    Procedure:LAMINECTOMY LUMBAR ONE LEVEL; Left Lumbar 3-4 Foraminotomy (c-arm); Surgeon:BRYAN HANSEN; Location:[facility]     ORTHOPEDIC SURGERY  9/7/2010    open repair left great toe laceration     SURGICAL HISTORY OF -   2009    Debridement of lateral meniscus, lateral compartment with debridement of the patellofemoral spur  "superior lateral patella.      SURGICAL HISTORY OF -   2010    Open wound, left great toe, possible laceration of the extensor tendon, left great toe.      [facility] REVISE THUMB TENDON  5/2003    Reconstruct ulnar collateral ligament-Right     Current Outpatient Medications   Medication Sig Dispense Refill     augmented betamethasone dipropionate (DIPROLENE-AF) 0.05 % external cream Apply sparingly twice daily as needed for 1-2 weeks (Patient not taking: Reported on 12/19/2022) 50 g 0     desoximetasone (TOPICORT LP) 0.05 % external cream Apply to affected area once or twice daily for 1-2 weeks, then only apply as needed (Patient not taking: Reported on 12/19/2022) 100 g 1     Multiple Vitamin (MULTI-VITAMIN) per tablet Take 1 tablet by mouth daily. (Patient not taking: Reported on 12/19/2022)         Allergies   Allergen Reactions     Azithromycin Blisters     Water blisters over entire body. Completed Z-pack, tolerated since taking Allegra,Benedryl.     Gabapentin Other (See Comments)     Tachycardia          Social History     Tobacco Use     Smoking status: Never     Smokeless tobacco: Never   Substance Use Topics     Alcohol use: Yes     Comment: 2 drinks per month     Family History   Problem Relation Age of Onset     Arthritis Mother         Rheumatoid     Hypertension Mother      Cancer Mother         basal cell on leg     Respiratory Brother         Asthma     Neurologic Disorder Paternal Grandfather         parkinsons     Respiratory Brother         asthma     Respiratory Son         asthma, allergies     Breast Cancer No family hx of      Cancer - colorectal No family hx of      History   Drug Use No         Objective     /78   Pulse 71   Temp 98.1  F (36.7  C) (Tympanic)   Resp 14   Ht 1.73 m (5' 8.11\")   Wt 64 kg (141 lb)   SpO2 96%   BMI 21.37 kg/m      Physical Exam  GENERAL APPEARANCE: healthy, alert and no distress  HENT: ear canals and TM's normal and nose and mouth without ulcers or " lesions  RESP: lungs clear to auscultation - no rales, rhonchi or wheezes  CV: regular rate and rhythm, normal S1 S2, no S3 or S4 and no murmur, click or rub   NEURO: Normal strength and tone, sensory exam grossly normal, mentation intact and speech normal    Recent Labs   Lab Test 12/19/22  1158   HGB 13.4         POTASSIUM 3.7   CR 0.70        Diagnostics:  No labs were ordered during this visit.   No EKG required for low risk surgery (cataract, skin procedure, breast biopsy, etc).    Revised Cardiac Risk Index (RCRI):  The patient has the following serious cardiovascular risks for perioperative complications:   - No serious cardiac risks = 0 points     RCRI Interpretation: 0 points: Class I (very low risk - 0.4% complication rate)           Signed Electronically by: Karrie Infante MD  Copy of this evaluation report is provided to requesting physician.

## 2023-01-13 NOTE — PATIENT INSTRUCTIONS
For informational purposes only. Not to replace the advice of your health care provider. Copyright   2003,  [employer] [facility]. All rights reserved. Clinically reviewed by Ling Medeiros MD. [employer] [id number] - REV .  Preparing for Your Surgery  Getting started  A nurse will call you to review your health history and instructions. They will give you an arrival time based on your scheduled surgery time. Please be ready to share:    Your doctor's clinic name and phone number    Your medical, surgical, and anesthesia history    A list of allergies and sensitivities    A list of medicines, including herbal treatments and over-the-counter drugs    Whether the patient has a legal guardian (ask how to send us the papers in advance)  Please tell us if you're pregnant--or if there's any chance you might be pregnant. Some surgeries may injure a fetus (unborn baby), so they require a pregnancy test. Surgeries that are safe for a fetus don't always need a test, and you can choose whether to have one.   If you have a child who's having surgery, please ask for a copy of Preparing for Your Child's Surgery.    Preparing for surgery    Within 10 to 30 days of surgery: Have a pre-op exam (sometimes called an H&P, or History and Physical). This can be done at a clinic or pre-operative center.  ? If you're having a , you may not need this exam. Talk to your care team.    At your pre-op exam, talk to your care team about all medicines you take. If you need to stop any medicines before surgery, ask when to start taking them again.  ? We do this for your safety. Many medicines can make you bleed too much during surgery. Some change how well surgery (anesthesia) drugs work.    Call your insurance company to let them know you're having surgery. (If you don't have insurance, call [phone].)    Call your clinic if there's any change in your health. This includes signs of a cold or flu (sore throat, runny nose,  cough, rash, fever). It also includes a scrape or scratch near the surgery site.    If you have questions on the day of surgery, call your hospital or surgery center.  Eating and drinking guidelines  For your safety: Unless your surgeon tells you otherwise, follow the guidelines below.    Eat and drink as usual until 8 hours before you arrive for surgery. After that, no food or milk.    Drink clear liquids until 2 hours before you arrive. These are liquids you can see through, like water, Gatorade, and Propel Water. They also include plain black coffee and tea (no cream or milk), candy, and breath mints. You can spit out gum when you arrive.    If you drink alcohol: Stop drinking it the night before surgery.    If your care team tells you to take medicine on the morning of surgery, it's okay to take it with a sip of water.  Preventing infection    Shower or bathe the night before and morning of your surgery. Follow the instructions your clinic gave you. (If no instructions, use regular soap.)    Don't shave or clip hair near your surgery site. We'll remove the hair if needed.    Don't smoke or vape the morning of surgery. You may chew nicotine gum up to 2 hours before surgery. A nicotine patch is okay.  ? Note: Some surgeries require you to completely quit smoking and nicotine. Check with your surgeon.    Your care team will make every effort to keep you safe from infection. We will:  ? Clean our hands often with soap and water (or an alcohol-based hand rub).  ? Clean the skin at your surgery site with a special soap that kills germs.  ? Give you a special gown to keep you warm. (Cold raises the risk of infection.)  ? Wear special hair covers, masks, gowns and gloves during surgery.  ? Give antibiotic medicine, if prescribed. Not all surgeries need antibiotics.  What to bring on the day of surgery    Photo ID and insurance card    Copy of your health care directive, if you have one    Glasses and hearing aids (bring  cases)  ? You can't wear contacts during surgery    Inhaler and eye drops, if you use them (tell us about these when you arrive)    CPAP machine or breathing device, if you use them    A few personal items, if spending the night    If you have . . .  ? A pacemaker, ICD (cardiac defibrillator) or other implant: Bring the ID card.  ? An implanted stimulator: Bring the remote control.  ? A legal guardian: Bring a copy of the certified (court-stamped) guardianship papers.  Please remove any jewelry, including body piercings. Leave jewelry and other valuables at home.  If you're going home the day of surgery    You must have a responsible adult drive you home. They should stay with you overnight as well.    If you don't have someone to stay with you, and you aren't safe to go home alone, we may keep you overnight. Insurance often won't pay for this.  After surgery  If it's hard to control your pain or you need more pain medicine, please call your surgeon's office.  Questions?   If you have any questions for your care team, list them here: _________________________________________________________________________________________________________________________________________________________________________ ____________________________________ ____________________________________ ____________________________________

## 2023-01-19 ENCOUNTER — HOSPITAL ENCOUNTER (OUTPATIENT)
Facility: CLINIC | Age: 63
Discharge: HOME | End: 2023-01-19
Attending: PODIATRIST | Admitting: PODIATRIST
Payer: COMMERCIAL

## 2023-01-19 ENCOUNTER — ANESTHESIA EVENT (OUTPATIENT)
Dept: SURGERY | Facility: CLINIC | Age: 63
End: 2023-01-19
Payer: COMMERCIAL

## 2023-01-19 ENCOUNTER — ANESTHESIA (OUTPATIENT)
Dept: SURGERY | Facility: CLINIC | Age: 63
End: 2023-01-19
Payer: COMMERCIAL

## 2023-01-19 VITALS
OXYGEN SATURATION: 99 % | TEMPERATURE: 97.4 F | RESPIRATION RATE: 17 BRPM | SYSTOLIC BLOOD PRESSURE: 126 MMHG | BODY MASS INDEX: 21.58 KG/M2 | HEART RATE: 57 BPM | HEIGHT: 68 IN | DIASTOLIC BLOOD PRESSURE: 73 MMHG | WEIGHT: 142.4 LBS

## 2023-01-19 DIAGNOSIS — Z98.890 S/P FOOT SURGERY: Primary | ICD-10-CM

## 2023-01-19 PROCEDURE — 999N000141 HC STATISTIC PRE-PROCEDURE NURSING ASSESSMENT: Performed by: PODIATRIST

## 2023-01-19 PROCEDURE — 258N000003 HC RX IP 258 OP 636: Performed by: ANESTHESIOLOGY

## 2023-01-19 PROCEDURE — 370N000017 HC ANESTHESIA TECHNICAL FEE, PER MIN: Performed by: PODIATRIST

## 2023-01-19 PROCEDURE — 250N000025 HC SEVOFLURANE, PER MIN: Performed by: PODIATRIST

## 2023-01-19 PROCEDURE — 250N000011 HC RX IP 250 OP 636: Performed by: ANESTHESIOLOGY

## 2023-01-19 PROCEDURE — 250N000009 HC RX 250: Performed by: NURSE ANESTHETIST, CERTIFIED REGISTERED

## 2023-01-19 PROCEDURE — 258N000003 HC RX IP 258 OP 636: Performed by: NURSE ANESTHETIST, CERTIFIED REGISTERED

## 2023-01-19 PROCEDURE — 250N000009 HC RX 250: Performed by: ANESTHESIOLOGY

## 2023-01-19 PROCEDURE — 360N000076 HC SURGERY LEVEL 3, PER MIN: Performed by: PODIATRIST

## 2023-01-19 PROCEDURE — C1713 ANCHOR/SCREW BN/BN,TIS/BN: HCPCS | Performed by: PODIATRIST

## 2023-01-19 PROCEDURE — 250N000011 HC RX IP 250 OP 636: Performed by: NURSE ANESTHETIST, CERTIFIED REGISTERED

## 2023-01-19 PROCEDURE — 272N000001 HC OR GENERAL SUPPLY STERILE: Performed by: PODIATRIST

## 2023-01-19 PROCEDURE — 710N000010 HC RECOVERY PHASE 1, LEVEL 2, PER MIN: Performed by: PODIATRIST

## 2023-01-19 PROCEDURE — 710N000012 HC RECOVERY PHASE 2, PER MINUTE: Performed by: PODIATRIST

## 2023-01-19 PROCEDURE — 250N000011 HC RX IP 250 OP 636: Performed by: PODIATRIST

## 2023-01-19 DEVICE — IMPLANT SYS, BIOC ACHILLES SPEEDB W/JUMP
Type: IMPLANTABLE DEVICE | Site: ANKLE | Status: FUNCTIONAL
Brand: ARTHREX®

## 2023-01-19 RX ORDER — ONDANSETRON 4 MG/1
4 TABLET, ORALLY DISINTEGRATING ORAL EVERY 30 MIN PRN
Status: DISCONTINUED | OUTPATIENT
Start: 2023-01-19 | End: 2023-01-19 | Stop reason: HOSPADM

## 2023-01-19 RX ORDER — DEXAMETHASONE SODIUM PHOSPHATE 10 MG/ML
INJECTION, SOLUTION INTRAMUSCULAR; INTRAVENOUS PRN
Status: DISCONTINUED | OUTPATIENT
Start: 2023-01-19 | End: 2023-01-19

## 2023-01-19 RX ORDER — HYDRALAZINE HYDROCHLORIDE 20 MG/ML
2.5-5 INJECTION INTRAMUSCULAR; INTRAVENOUS EVERY 10 MIN PRN
Status: DISCONTINUED | OUTPATIENT
Start: 2023-01-19 | End: 2023-01-19 | Stop reason: HOSPADM

## 2023-01-19 RX ORDER — FENTANYL CITRATE 50 UG/ML
25 INJECTION, SOLUTION INTRAMUSCULAR; INTRAVENOUS EVERY 5 MIN PRN
Status: DISCONTINUED | OUTPATIENT
Start: 2023-01-19 | End: 2023-01-19 | Stop reason: HOSPADM

## 2023-01-19 RX ORDER — ONDANSETRON 2 MG/ML
INJECTION INTRAMUSCULAR; INTRAVENOUS PRN
Status: DISCONTINUED | OUTPATIENT
Start: 2023-01-19 | End: 2023-01-19

## 2023-01-19 RX ORDER — ASPIRIN 81 MG/1
81 TABLET, CHEWABLE ORAL DAILY
Qty: 35 TABLET | Refills: 0 | Status: SHIPPED | OUTPATIENT
Start: 2023-01-19

## 2023-01-19 RX ORDER — HYDROXYZINE HYDROCHLORIDE 25 MG/1
25 TABLET, FILM COATED ORAL
Status: DISCONTINUED | OUTPATIENT
Start: 2023-01-19 | End: 2023-01-19 | Stop reason: HOSPADM

## 2023-01-19 RX ORDER — BUPIVACAINE HYDROCHLORIDE 5 MG/ML
INJECTION, SOLUTION EPIDURAL; INTRACAUDAL
Status: COMPLETED | OUTPATIENT
Start: 2023-01-19 | End: 2023-01-19

## 2023-01-19 RX ORDER — FENTANYL CITRATE 50 UG/ML
50 INJECTION, SOLUTION INTRAMUSCULAR; INTRAVENOUS EVERY 5 MIN PRN
Status: DISCONTINUED | OUTPATIENT
Start: 2023-01-19 | End: 2023-01-19 | Stop reason: HOSPADM

## 2023-01-19 RX ORDER — CEFAZOLIN SODIUM/WATER 2 G/20 ML
2 SYRINGE (ML) INTRAVENOUS SEE ADMIN INSTRUCTIONS
Status: DISCONTINUED | OUTPATIENT
Start: 2023-01-19 | End: 2023-01-19 | Stop reason: HOSPADM

## 2023-01-19 RX ORDER — PROPOFOL 10 MG/ML
INJECTION, EMULSION INTRAVENOUS PRN
Status: DISCONTINUED | OUTPATIENT
Start: 2023-01-19 | End: 2023-01-19

## 2023-01-19 RX ORDER — HYDROXYZINE PAMOATE 25 MG/1
CAPSULE ORAL
Qty: 30 CAPSULE | Refills: 0 | Status: SHIPPED | OUTPATIENT
Start: 2023-01-19

## 2023-01-19 RX ORDER — HALOPERIDOL 5 MG/ML
1 INJECTION INTRAMUSCULAR
Status: DISCONTINUED | OUTPATIENT
Start: 2023-01-19 | End: 2023-01-19 | Stop reason: HOSPADM

## 2023-01-19 RX ORDER — OXYCODONE HYDROCHLORIDE 5 MG/1
TABLET ORAL
Qty: 30 TABLET | Refills: 0 | Status: SHIPPED | OUTPATIENT
Start: 2023-01-19

## 2023-01-19 RX ORDER — LIDOCAINE 40 MG/G
CREAM TOPICAL
Status: DISCONTINUED | OUTPATIENT
Start: 2023-01-19 | End: 2023-01-19 | Stop reason: HOSPADM

## 2023-01-19 RX ORDER — SODIUM CHLORIDE, SODIUM LACTATE, POTASSIUM CHLORIDE, CALCIUM CHLORIDE 600; 310; 30; 20 MG/100ML; MG/100ML; MG/100ML; MG/100ML
INJECTION, SOLUTION INTRAVENOUS CONTINUOUS
Status: DISCONTINUED | OUTPATIENT
Start: 2023-01-19 | End: 2023-01-19 | Stop reason: HOSPADM

## 2023-01-19 RX ORDER — ONDANSETRON 4 MG/1
4 TABLET, ORALLY DISINTEGRATING ORAL
Status: DISCONTINUED | OUTPATIENT
Start: 2023-01-19 | End: 2023-01-19 | Stop reason: HOSPADM

## 2023-01-19 RX ORDER — CEFAZOLIN SODIUM/WATER 2 G/20 ML
2 SYRINGE (ML) INTRAVENOUS
Status: COMPLETED | OUTPATIENT
Start: 2023-01-19 | End: 2023-01-19

## 2023-01-19 RX ORDER — FENTANYL CITRATE 50 UG/ML
INJECTION, SOLUTION INTRAMUSCULAR; INTRAVENOUS PRN
Status: DISCONTINUED | OUTPATIENT
Start: 2023-01-19 | End: 2023-01-19

## 2023-01-19 RX ORDER — ONDANSETRON 2 MG/ML
4 INJECTION INTRAMUSCULAR; INTRAVENOUS EVERY 30 MIN PRN
Status: DISCONTINUED | OUTPATIENT
Start: 2023-01-19 | End: 2023-01-19 | Stop reason: HOSPADM

## 2023-01-19 RX ORDER — HYDROMORPHONE HCL IN WATER/PF 6 MG/30 ML
0.4 PATIENT CONTROLLED ANALGESIA SYRINGE INTRAVENOUS EVERY 5 MIN PRN
Status: DISCONTINUED | OUTPATIENT
Start: 2023-01-19 | End: 2023-01-19 | Stop reason: HOSPADM

## 2023-01-19 RX ORDER — FENTANYL CITRATE 50 UG/ML
100 INJECTION, SOLUTION INTRAMUSCULAR; INTRAVENOUS ONCE
Status: COMPLETED | OUTPATIENT
Start: 2023-01-19 | End: 2023-01-19

## 2023-01-19 RX ORDER — LIDOCAINE HYDROCHLORIDE 10 MG/ML
INJECTION, SOLUTION INFILTRATION; PERINEURAL PRN
Status: DISCONTINUED | OUTPATIENT
Start: 2023-01-19 | End: 2023-01-19

## 2023-01-19 RX ORDER — OXYCODONE HYDROCHLORIDE 5 MG/1
5 TABLET ORAL
Status: DISCONTINUED | OUTPATIENT
Start: 2023-01-19 | End: 2023-01-19 | Stop reason: HOSPADM

## 2023-01-19 RX ORDER — LABETALOL HYDROCHLORIDE 5 MG/ML
10 INJECTION, SOLUTION INTRAVENOUS
Status: DISCONTINUED | OUTPATIENT
Start: 2023-01-19 | End: 2023-01-19 | Stop reason: HOSPADM

## 2023-01-19 RX ORDER — HYDROMORPHONE HCL IN WATER/PF 6 MG/30 ML
0.2 PATIENT CONTROLLED ANALGESIA SYRINGE INTRAVENOUS EVERY 5 MIN PRN
Status: DISCONTINUED | OUTPATIENT
Start: 2023-01-19 | End: 2023-01-19 | Stop reason: HOSPADM

## 2023-01-19 RX ORDER — IBUPROFEN 200 MG
200-400 TABLET ORAL EVERY 4 HOURS PRN
COMMUNITY

## 2023-01-19 RX ADMIN — DEXAMETHASONE SODIUM PHOSPHATE 10 MG: 10 INJECTION, SOLUTION INTRAMUSCULAR; INTRAVENOUS at 11:38

## 2023-01-19 RX ADMIN — PROPOFOL 100 MCG/KG/MIN: 10 INJECTION, EMULSION INTRAVENOUS at 12:03

## 2023-01-19 RX ADMIN — SODIUM CHLORIDE, POTASSIUM CHLORIDE, SODIUM LACTATE AND CALCIUM CHLORIDE: 600; 310; 30; 20 INJECTION, SOLUTION INTRAVENOUS at 12:15

## 2023-01-19 RX ADMIN — ONDANSETRON 4 MG: 2 INJECTION INTRAMUSCULAR; INTRAVENOUS at 12:36

## 2023-01-19 RX ADMIN — PHENYLEPHRINE HYDROCHLORIDE 200 MCG: 10 INJECTION INTRAVENOUS at 12:00

## 2023-01-19 RX ADMIN — PHENYLEPHRINE HYDROCHLORIDE 0.4 MCG/KG/MIN: 10 INJECTION INTRAVENOUS at 12:10

## 2023-01-19 RX ADMIN — ROCURONIUM BROMIDE 50 MG: 10 INJECTION, SOLUTION INTRAVENOUS at 11:38

## 2023-01-19 RX ADMIN — SUGAMMADEX 200 MG: 100 INJECTION, SOLUTION INTRAVENOUS at 12:39

## 2023-01-19 RX ADMIN — MIDAZOLAM HYDROCHLORIDE 2 MG: 1 INJECTION, SOLUTION INTRAMUSCULAR; INTRAVENOUS at 10:34

## 2023-01-19 RX ADMIN — FENTANYL CITRATE 50 MCG: 50 INJECTION, SOLUTION INTRAMUSCULAR; INTRAVENOUS at 10:35

## 2023-01-19 RX ADMIN — LIDOCAINE HYDROCHLORIDE 20 MG: 10 INJECTION, SOLUTION INFILTRATION; PERINEURAL at 11:38

## 2023-01-19 RX ADMIN — FENTANYL CITRATE 100 MCG: 50 INJECTION, SOLUTION INTRAMUSCULAR; INTRAVENOUS at 11:38

## 2023-01-19 RX ADMIN — BUPIVACAINE HYDROCHLORIDE 20 ML: 5 INJECTION, SOLUTION EPIDURAL; INTRACAUDAL at 10:35

## 2023-01-19 RX ADMIN — PHENYLEPHRINE HYDROCHLORIDE 200 MCG: 10 INJECTION INTRAVENOUS at 12:40

## 2023-01-19 RX ADMIN — SODIUM CHLORIDE, POTASSIUM CHLORIDE, SODIUM LACTATE AND CALCIUM CHLORIDE: 600; 310; 30; 20 INJECTION, SOLUTION INTRAVENOUS at 10:22

## 2023-01-19 RX ADMIN — BUPIVACAINE HYDROCHLORIDE 10 ML: 5 INJECTION, SOLUTION EPIDURAL; INTRACAUDAL at 10:40

## 2023-01-19 RX ADMIN — Medication 2 G: at 11:31

## 2023-01-19 RX ADMIN — PROPOFOL 150 MG: 10 INJECTION, EMULSION INTRAVENOUS at 11:38

## 2023-01-19 ASSESSMENT — ACTIVITIES OF DAILY LIVING (ADL)
ADLS_ACUITY_SCORE: 20
ADLS_ACUITY_SCORE: 20

## 2023-01-19 NOTE — ANESTHESIA PREPROCEDURE EVALUATION
Anesthesia Pre-Procedure Evaluation    Patient: Thais Castellon   MRN: [id number] : 1960        Procedure : Procedure(s):  LEFT POSTERIOR HEEL EXOSTECTOMY  LEFT ACHILLES TENDON DEBRIDEMENT          Past Medical History:   Diagnosis Date     Anemia, unspecified     resolved .w hyst     Dysplasia of cervix, unspecified     ASMITA III on cervical path with hyst     Leiomyoma of uterus, unspecified     Hyst      Ulnar collateral ligament sprain(841.1)     Ulnar collateral ligament     Unspecified polyarthropathy or polyarthritis, multiple sites     Parvovirus infection 3/05      Past Surgical History:   Procedure Laterality Date     ARTHROPLASTY KNEE  2014    Procedure: ARTHROPLASTY KNEE;  Left Total Knee Arthroplasty;  Surgeon: Kaiden Arteaga MD;  Location: [facility] OR     ARTHROSCOPY KNEE RT/LT  age 11    Left knee     D & C  x 3    Missed Ab's     HC LAPAROSCOPY, SURGICAL, ABDOMEN, PERITONEUM & OMENTUM; DX W/ OR W/O SPECIMEN(S)      Dx lap for infertility     HYSTERECTOMY, PAP NO LONGER INDICATED       HYSTERECTOMY, VAGINAL  2004     LAMINECTOMY LUMBAR ONE LEVEL  2012    Procedure:LAMINECTOMY LUMBAR ONE LEVEL; Left Lumbar 3-4 Foraminotomy (c-arm); Surgeon:BRYAN HANSEN; Location:[facility]     ORTHOPEDIC SURGERY  2010    open repair left great toe laceration     SURGICAL HISTORY OF -       Debridement of lateral meniscus, lateral compartment with debridement of the patellofemoral spur superior lateral patella.      SURGICAL HISTORY OF -       Open wound, left great toe, possible laceration of the extensor tendon, left great toe.      [facility] REVISE THUMB TENDON  2003    Reconstruct ulnar collateral ligament-Right      Allergies   Allergen Reactions     Azithromycin Blisters     Water blisters over entire body. Completed Z-pack, tolerated since taking Allegra,Benedryl.     Gabapentin Other (See Comments)     Tachycardia        Social History     Tobacco Use     Smoking status:  Never     Smokeless tobacco: Never   Substance Use Topics     Alcohol use: Yes     Comment: 2 drinks per month      Wt Readings from Last 1 Encounters:   01/19/23 64.6 kg (142 lb 6.4 oz)        Anesthesia Evaluation   Pt has had prior anesthetic.     No history of anesthetic complications       ROS/MED HX  ENT/Pulmonary:  - neg pulmonary ROS     Neurologic:  - neg neurologic ROS     Cardiovascular:  - neg cardiovascular ROS     METS/Exercise Tolerance:     Hematologic:  - neg hematologic  ROS     Musculoskeletal:   (+) arthritis,     GI/Hepatic:  - neg GI/hepatic ROS     Renal/Genitourinary:  - neg Renal ROS     Endo:  - neg endo ROS     Psychiatric/Substance Use:       Infectious Disease:       Malignancy:       Other:            Physical Exam    Airway        Mallampati: I   TM distance: > 3 FB   Neck ROM: full     Respiratory Devices and Support         Dental       (+) Completely normal teeth      Cardiovascular   cardiovascular exam normal          Pulmonary                   OUTSIDE LABS:  CBC:   Lab Results   Component Value Date    WBC 3.5 (L) 12/19/2022    WBC 4.5 04/10/2014    HGB 13.4 12/19/2022    HGB 9.7 (L) 04/10/2014    HCT 39.3 12/19/2022    HCT 29.5 (L) 04/10/2014     12/19/2022     (L) 04/10/2014     BMP:   Lab Results   Component Value Date     12/19/2022     03/28/2014    POTASSIUM 3.7 12/19/2022    POTASSIUM 3.5 03/28/2014    CHLORIDE 101 12/19/2022    CHLORIDE 102 03/28/2014    CO2 28 12/19/2022    CO2 28 03/28/2014    BUN 13.8 12/19/2022    BUN 13 03/28/2014    CR 0.70 12/19/2022    CR 0.68 03/28/2014    GLC 95 12/19/2022     (H) 04/09/2014     COAGS:   Lab Results   Component Value Date    PTT 32 03/28/2014    INR 0.93 03/28/2014     POC:   Lab Results   Component Value Date    HCG Negative 05/18/2004     HEPATIC:   Lab Results   Component Value Date    ALBUMIN 3.1 (L) 03/06/2005    PROTTOTAL 5.8 (L) 03/06/2005    ALT 19 03/06/2005    AST 23 03/06/2005     ALKPHOS 53 03/06/2005    BILITOTAL 0.3 03/06/2005     OTHER:   Lab Results   Component Value Date    CARLOTA 9.9 12/19/2022    TSH 0.87 06/30/2006    T4 0.89 06/30/2006       Anesthesia Plan    ASA Status:  2      Anesthesia Type: General.     - Airway: ETT              Consents    Anesthesia Plan(s) and associated risks, benefits, and realistic alternatives discussed. Questions answered and patient/representative(s) expressed understanding.    - Discussed:     - Discussed with:  Patient         Postoperative Care    Pain management: Peripheral nerve block (Continuous), Multi-modal analgesia.        Comments:    Other Comments: Adductor and popliteal per surgeon request            Etta Preciado MD

## 2023-01-19 NOTE — ANESTHESIA PROCEDURE NOTES
Airway       Patient location during procedure: OR       Procedure Start/Stop Times: 1/19/2023 11:42 AM  Staff -        Anesthesiologist:  Etta Preciado MD       CRNA: Janna Cramer APRN CRNA       Performed By: CRNA  Consent for Airway        Urgency: elective  Indications and Patient Condition       Indications for airway management: jeannette-procedural and airway protection       Induction type:intravenous       Mask difficulty assessment: 2 - vent by mask + OA or adjuvant +/- NMBA    Final Airway Details       Final airway type: endotracheal airway       Successful airway: ETT - single  Endotracheal Airway Details        ETT size (mm): 7.0       Cuffed: yes       Successful intubation technique: direct laryngoscopy       DL Blade Type: MAC 3       Grade View of Cords: 1       Adjucts: stylet       Position: Right       Measured from: lips       Secured at (cm): 21       Bite block used: None    Post intubation assessment        Placement verified by: capnometry, equal breath sounds and chest rise        Number of attempts at approach: 1       Number of other approaches attempted: 0       Secured with: silk tape       Ease of procedure: easy       Dentition: Intact and Unchanged       Dental guard used and removed. Dental Guard Type: Proguard Red.    Medication(s) Administered   Medication Administration Time: 1/19/2023 11:42 AM

## 2023-01-19 NOTE — ANESTHESIA POSTPROCEDURE EVALUATION
Patient: Thais Castellon    Procedure: Procedure(s):  LEFT POSTERIOR HEEL EXOSTECTOMY  LEFT ACHILLES TENDON DEBRIDEMENT       Anesthesia Type:  General    Note:  Disposition: Outpatient   Postop Pain Control: Uneventful            Sign Out: Well controlled pain   PONV: No   Neuro/Psych: Uneventful            Sign Out: Acceptable/Baseline neuro status   Airway/Respiratory: Uneventful            Sign Out: Acceptable/Baseline resp. status   CV/Hemodynamics: Uneventful            Sign Out: Acceptable CV status; No obvious hypovolemia; No obvious fluid overload   Other NRE: NONE   DID A NON-ROUTINE EVENT OCCUR? No           Last vitals:  Vitals Value Taken Time   /79 01/19/23 1315   Temp 36.6  C (97.9  F) 01/19/23 1252   Pulse 66 01/19/23 1319   Resp 31 01/19/23 1319   SpO2 98 % 01/19/23 1319   Vitals shown include unvalidated device data.    Electronically Signed By: Etta Preciado MD  January 19, 2023  1:34 PM

## 2023-01-19 NOTE — OP NOTE
Procedure Date: 01/19/2023    SURGEON:  Shivam Andrade MD    PREOPERATIVE DIAGNOSES:  1.  Retrocalcaneal exostosis, left heel.  2.  Achilles tendinosis, left heel.    POSTOPERATIVE DIAGNOSES:  1.  Retrocalcaneal exostosis, left heel.  2.  Achilles tendinosis, left heel.    PROCEDURE PERFORMED:  1.  Exostectomy posterior heel, left.  2.  Achilles tendon debridement with reattachment using an Arthrex SpeedBridge anchor.    ANESTHESIA:  General with popliteal nerve block.    PREOPERATIVE ANTIBIOTIC:  Ancef 2 grams IV x1.    ESTIMATED BLOOD LOSS:  Minimal.    INDICATIONS FOR PROCEDURE:  The patient is a 62-year-old female who has a history of a retrocalcaneal exostosis, as well as tendinosis of the Achilles tendon insertion.  She is here today for surgical repair.  She has failed conservative treatment.  We discussed the procedure, perioperative course and procedures complication with her.  All questions answered, consent was obtained.    DESCRIPTION OF PROCEDURE:  The patient was brought to operating room and placed on the table in the supine position.  General anesthesia was administered.  A popliteal nerve block was administered by the Anesthesia Department.  The foot and leg were prepped and draped in the usual sterile manner.  A thigh tourniquet was inflated to 300 mmHg after the foot and leg were exsanguinated using an Esmarch bandage.  Attention directed to the posterior aspect of the distal heel on this left side where a 6 cm linear incision was performed.  It was deepened via sharp and blunt dissection with care to identify and retract all neurovascular structures.  Hemostasis was obtained as needed.  It was deepened through the paratenon.  The Achilles tendon was divided longitudinally.  Through the same incision, I was able to visualize the Achilles tendon.  It was actually found to be quite thick from anterior to posterior.  There was obvious degeneration on the anterior 25% or so of the Achilles, this was  trimmed out.  The tendon was released from its insertion by about 80%.  With an osteotome and mallet, a rongeur and hand rasp, the bony spurring as well as the posterior superior process of the calcaneus was removed.  Next, the tendon was reattached to its insertion that was compromised with an Arthrex SpeedBridge anchor in the usual fashion.  The wound was irrigated with sterile saline.  It was closed in layers with 2-0 Vicryl, 2-0 Vicryl and 4-0 nylon.  A dry sterile compressive dressing consisting of Betadine-soaked Adaptic, 4 x 4s, and Aryan was applied.  She was placed into a nonweightbearing posterior splint.  The patient tolerated the procedures and anesthesia well without complications.  She was given written and oral postoperative instructions and will return to clinic in 10 to 12 days for postop check.    Shivam Andrade MD        D: 2023   T: 2023   MT: holger    Name:     LORELEI CABRERABrijesh  MRN:      [id number]        Account:        [id number]   :      1960           Procedure Date: 2023     Document: [id number]

## 2023-01-19 NOTE — BRIEF OP NOTE
[facility]    Brief Operative Note    Pre-operative diagnosis: Foot pain [M79.673]  Post-operative diagnosis Same as pre-operative diagnosis    Procedure: Procedure(s):  LEFT POSTERIOR HEEL EXOSTECTOMY  LEFT ACHILLES TENDON DEBRIDEMENT  Surgeon: Surgeon(s) and Role:     * Shivam Andrade DPM - Primary  Anesthesia: General   Estimated Blood Loss: Minimal    Drains: None  Specimens: * No specimens in log *  Findings:   None.  Complications: None.  Implants:   Implant Name Type Inv. Item Serial No.  Lot No. LRB No. Used Action   BRIDGE SPEED W/JUMPSTART [id number] - [id number] Total Joint Component/Insert BRIDGE SPEED W/JUMPSTART [id number]  [employer] [id number] Left 1 Implanted

## 2023-01-19 NOTE — ANESTHESIA PROCEDURE NOTES
Adductor canal Procedure Note    Pre-Procedure   Staff -        Anesthesiologist:  Etta Preciado MD       Performed By: anesthesiologist       Location: pre-op       Procedure Start/Stop Times: 1/19/2023 10:40 AM and 1/19/2023 10:42 AM       Pre-Anesthestic Checklist: patient identified, IV checked, site marked, risks and benefits discussed, informed consent, monitors and equipment checked, pre-op evaluation, at physician/surgeon's request and post-op pain management  Timeout:       Correct Patient: Yes        Correct Procedure: Yes        Correct Site: Yes        Correct Position: Yes        Correct Laterality: Yes        Site Marked: Yes  Procedure Documentation  Procedure: Adductor canal       Laterality: left       Patient Position: supine       Patient Prep/Sterile Barriers: sterile gloves, mask       Skin prep: Chloraprep       Needle Type: short bevel       Needle Gauge: 21.        Needle Length (Inches): 4        Ultrasound guided       1. Ultrasound was used to identify targeted nerve, plexus, vascular marker, or fascial plane and place a needle adjacent to it in real-time.       2. Ultrasound was used to visualize the spread of anesthetic in close proximity to the above referenced structure.       3. A permanent image is entered into the patient's record.       4. The visualized anatomic structures appeared normal.       5. There were no apparent abnormal pathologic findings.    Assessment/Narrative         The placement was negative for: blood aspirated, painful injection and site bleeding       Paresthesias: No.       Bolus given via needle..        Secured via.        Insertion/Infusion Method: Single Shot       Complications: none       Injection made incrementally with aspirations every 5 mL.    Medication(s) Administered   Bupivacaine 0.5% PF (Infiltration) - Infiltration   10 mL - 1/19/2023 10:40:00 AM  Medication Administration Time: 1/19/2023 10:40 AM      FOR [facility] ([facility] [place])  "ONLY:   Pain Team Contact information: please page the Pain Team Via [facility]. Search \"Pain\". During daytime hours, please page the attending first. At night please page the resident first.    "

## 2023-01-19 NOTE — ANESTHESIA CARE TRANSFER NOTE
Patient: Thais Castellon    Procedure: Procedure(s):  LEFT POSTERIOR HEEL EXOSTECTOMY  LEFT ACHILLES TENDON DEBRIDEMENT       Diagnosis: Foot pain [M79.673]  Diagnosis Additional Information: No value filed.    Anesthesia Type:   General     Note:    Oropharynx: oropharynx clear of all foreign objects  Level of Consciousness: drowsy  Oxygen Supplementation: face mask  Level of Supplemental Oxygen (L/min / FiO2): 8  Independent Airway: airway patency satisfactory and stable  Dentition: dentition unchanged  Vital Signs Stable: post-procedure vital signs reviewed and stable  Report to RN Given: handoff report given  Patient transferred to: PACU    Handoff Report: Identifed the Patient, Identified the Reponsible Provider, Reviewed the pertinent medical history, Discussed the surgical course, Reviewed Intra-OP anesthesia mangement and issues during anesthesia, Set expectations for post-procedure period and Allowed opportunity for questions and acknowledgement of understanding      Vitals:  Vitals Value Taken Time   /74 01/19/23 1251   Temp 97.9f 01/19/23 1281   Pulse 64 01/19/23 1253   Resp 13 01/19/23 1253   SpO2 98 % 01/19/23 1253   Vitals shown include unvalidated device data.    Electronically Signed By: KELLIE HIGGINS CRNA  January 19, 2023  12:54 PM

## 2023-01-19 NOTE — PHARMACY-ADMISSION MEDICATION HISTORY
Pharmacy Note - Admission Medication History    Pertinent Provider Information: N/A   ______________________________________________________________________    Prior To Admission (PTA) med list completed and updated in EMR.       PTA Med List   Medication Sig Last Dose     acetaminophen (TYLENOL) 500 MG tablet Take 500-1,000 mg by mouth every 6 hours as needed for mild pain 1/18/2023 at 2130     ibuprofen (ADVIL/MOTRIN) 200 MG tablet Take 200-400 mg by mouth every 4 hours as needed for pain 1/12/2023       Information source(s): Patient and Clinic records    Method of interview communication: in-person    Patient was asked about OTC/herbal products specifically.  PTA med list reflects this.    Based on the pharmacist's assessment, the PTA med list information appears reliable    Medication Affordability:       Allergies were reviewed, assessed, and updated with the patient.      Patient does not use any multi-dose medications prior to admission.     Thank you for the opportunity to participate in the care of this patient.      Emmett Cardona [facility]     1/19/2023     9:57 AM

## 2023-01-19 NOTE — ANESTHESIA PROCEDURE NOTES
Sciatic Procedure Note    Pre-Procedure   Staff -        Anesthesiologist:  Etta Preciado MD       Performed By: anesthesiologist       Location: pre-op       Procedure Start/Stop Times: 1/19/2023 10:35 AM and 1/19/2023 10:40 AM       Pre-Anesthestic Checklist: patient identified, IV checked, site marked, risks and benefits discussed, informed consent, monitors and equipment checked, pre-op evaluation, at physician/surgeon's request and post-op pain management  Timeout:       Correct Patient: Yes        Correct Procedure: Yes        Correct Site: Yes        Correct Position: Yes        Correct Laterality: Yes        Site Marked: Yes  Procedure Documentation  Procedure: Sciatic       Laterality: left       Patient Position: supine       Patient Prep/Sterile Barriers: sterile gloves, mask       Skin prep: Chloraprep (popliteal approach).       Needle Type: short bevel       Needle Gauge: 21.        Needle Length (Inches): 4        Ultrasound guided       1. Ultrasound was used to identify targeted nerve, plexus, vascular marker, or fascial plane and place a needle adjacent to it in real-time.       2. Ultrasound was used to visualize the spread of anesthetic in close proximity to the above referenced structure.       3. A permanent image is entered into the patient's record.       4. The visualized anatomic structures appeared normal.       5. There were no apparent abnormal pathologic findings.    Assessment/Narrative         The placement was negative for: blood aspirated, painful injection and site bleeding       Paresthesias: No.       Bolus given via needle..        Secured via.        Insertion/Infusion Method: Single Shot       Complications: none       Injection made incrementally with aspirations every 5 mL.    Medication(s) Administered   Bupivacaine 0.5% PF (Infiltration) - Infiltration   20 mL - 1/19/2023 10:35:00 AM  Medication Administration Time: 1/19/2023 10:35 AM      FOR [facility]  "([facility]) ONLY:   Pain Team Contact information: please page the Pain Team Via [employer]. Search \"Pain\". During daytime hours, please page the attending first. At night please page the resident first.    "

## 2023-01-30 ENCOUNTER — TRANSFERRED RECORDS (OUTPATIENT)
Dept: HEALTH INFORMATION MANAGEMENT | Facility: CLINIC | Age: 63
End: 2023-01-30

## 2023-02-15 ENCOUNTER — TRANSFERRED RECORDS (OUTPATIENT)
Dept: HEALTH INFORMATION MANAGEMENT | Facility: CLINIC | Age: 63
End: 2023-02-15
Payer: COMMERCIAL

## 2023-03-01 ENCOUNTER — TELEPHONE (OUTPATIENT)
Dept: FAMILY MEDICINE | Facility: CLINIC | Age: 63
End: 2023-03-01
Payer: COMMERCIAL

## 2023-03-01 NOTE — TELEPHONE ENCOUNTER
Patient Quality Outreach    Patient is due for the following:   Mammogram and colonoscopy    Next Steps:   - Schedule annual physical  - Update vaccines  - Complete mammogram and colon screen    Type of outreach:    Sent letter.      Questions for provider review:    None     Allegra Arshad, CMA

## 2023-03-01 NOTE — LETTER
[facility]  [address]  [facility] [address] [phone]  March 1, 2023    Thais Castellon  [address] [place] [address]    Dear Thais,    You are due to schedule your annual physical. You are also due to update some vaccines, shingles, Covid19 booster and tetanus. Please contact your insurance company prior to receiving the shingles vaccine to ensure this is covered. Feel free to call [phone] to schedule an appointment to get these updated!    I will also include some information on scheduling your mammogram and colon screening:    -- Mammogram.  Please call one of the following numbers to schedule:    [facility] [phone]  [facility] [phone]  [facility] [phone]  [facility] [phone]  [facility] [phone]    -- Colon screen. Colonoscopy or Cologuard test (take home test). One of these tests is recommended at age 50 to screen for colon cancer.   Please call one of the following numbers to schedule a colonoscopy:  [facility] [phone]  [facility] [phone]  [facility]  [phone]  [facility] [phone] (multiple sites, call for locations)  OR....  If you prefer to do a screening that is LESS INVASIVE AND LESS EXPENSIVE there is an test for you! It is called the Cologuard test. It is a screening test that is done every 3 years and can be DONE AT HOME! Do the test at home and mail it in (you don't even have to pay for postage). If you are willing to do this test, we can order the kit for you. Please call us at [phone] if you need an order for a Colonoscopy or Cologuard test.              Thank you for trusting [facility] and we appreciate the opportunity to serve you.  We look forward to supporting your healthcare needs in the future.    Healthy Regards,      Your Health Care Team  [facility]

## 2023-03-13 ENCOUNTER — TRANSFERRED RECORDS (OUTPATIENT)
Dept: HEALTH INFORMATION MANAGEMENT | Facility: CLINIC | Age: 63
End: 2023-03-13
Payer: COMMERCIAL

## 2023-03-15 ENCOUNTER — TRANSCRIBE ORDERS (OUTPATIENT)
Dept: OTHER | Age: 63
End: 2023-03-15

## 2023-03-15 ENCOUNTER — HOSPITAL ENCOUNTER (OUTPATIENT)
Dept: PHYSICAL THERAPY | Facility: CLINIC | Age: 63
Setting detail: THERAPIES SERIES
Discharge: HOME | End: 2023-03-15
Attending: PODIATRIST
Payer: COMMERCIAL

## 2023-03-15 DIAGNOSIS — Z47.89 ENCOUNTER FOR OTHER ORTHOPEDIC AFTERCARE: Primary | ICD-10-CM

## 2023-03-15 DIAGNOSIS — Z47.89 ENCOUNTER FOR OTHER ORTHOPEDIC AFTERCARE: ICD-10-CM

## 2023-03-15 PROCEDURE — 97110 THERAPEUTIC EXERCISES: CPT | Mod: GP

## 2023-03-15 PROCEDURE — 97116 GAIT TRAINING THERAPY: CPT | Mod: GP

## 2023-03-15 PROCEDURE — 97161 PT EVAL LOW COMPLEX 20 MIN: CPT | Mod: GP

## 2023-03-15 NOTE — PROGRESS NOTES
03/15/23 1000   General Information   Type of Visit Initial OP Ortho PT Evaluation   Start of Care Date 03/15/23   Referring Physician Shivam Andrade DPM   Patient/Family Goals Statement Be 100% recovered, walking normally, be able to exercise normally   Orders Evaluate and Treat   Date of Order 03/13/23   Certification Required? No   Medical Diagnosis S/P foot surgery, left (Z98.890). Achilles tendon debirdement, left. Posterior heel exostectomy, left on 1/19/23.   Surgical/Medical history reviewed Yes   Precautions/Limitations no known precautions/limitations   Body Part(s)   Body Part(s) Ankle/Foot   Presentation and Etiology   Pertinent history of current problem (include personal factors and/or comorbidities that impact the POC) Pt presents for new patient evaluation of L foot/ankle pain s/p achilles tendon debridement and posterior heel exostectomy (left) on 1/19/23. Pt is now 8 weeks post op. Last post-op appt was on 3/13/22 where her surgeon advised she can start PT and wean out of the CAM boot. Has been doing recumbent bike with the boot on thus far, now able to do without. Can start elliptical without boot as well. Since the surgery, she does not have pain. Still mild swelling which she does ice for. Feels she has a lot of weakness in the L ankle. Has been doing indoor walking without boot, feels unstable walking outdoors without the boot. Feels she is at risk of hyperextending the L knee due to quad weakness. Pt has a history of L TKA. Pt is very active, walks daily and likes to exercise 5x/week with HIIT/strength. Pt is not doing any jumping, is doing upper body exercises, and ROM exercise for the ankle. Pt has been doing scar mobilization. Pt denies any other significant medical or surgical history.   Impairments B. Decreased WB tolerance;D. Decreased ROM;E. Decreased flexibility;F. Decreased strength and endurance;H. Impaired gait   Functional Limitations perform desired leisure / sports  activities;perform activities of daily living   Symptom Location L foot/ankle   How/Where did it occur Other  (post op 1/19/23)   Onset date of current episode/exacerbation 01/19/23   Chronicity New   Pain rating (0-10 point scale) Best (/10);Worst (/10)   Best (/10) 1   Worst (/10) 4   Pain quality C. Aching;D. Burning   Frequency of pain/symptoms C. With activity   Pain/symptoms are: Worse during the day   Pain/symptoms exacerbated by B. Walking   Pain/symptoms eased by H. Cold;I. OTC medication(s)   Progression of symptoms since onset: Improved   Prior Level of Function   Functional Level Prior Comment Independent with ADLs and mobility.   Current Level of Function   Patient role/employment history A. 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 No   Have you fallen and had an injury in the past year? No   Is patient a fall risk? No   Abuse Screen (yes response referral indicated)   Feels Unsafe at Home or Work/School no   Feels Threatened by Someone no   Does Anyone Try to Keep You From Having Contact with Others or Doing Things Outside Your Home? no   Physical Signs of Abuse Present no   Ankle/Foot Objective Findings   Side (if bilateral, select both right and left) Left   Gait/Locomotion Mild antalgia LLE. Trendelenberg on the L side. No devices. (assessed without CAM boot on). Stair navigation reciprocal without handrails and no limitations/gait deviations.   Ankle/Foot Strength Comments 5-/5 L ankle DF, 4+/5 L ankle juan/inv   Left Gastroc (in WB) Flexibility Moderate tension L   Left Soleus (in WB) Flexibility Moderate tension L   Palpation no significant TTP to L foot or ankle musculature, mildly sensitive directly over most inferior aspect of incision with scar tissue noted   Left DF (Knee Ext) AROM 9*   Left PF AROM 61*   Left Calcanceal Inversion AROM  44*   Left Calcaneal Eversion AROM 31*   Observation Pt wearing CAM boot on the L   Integumentary Incision over L Achilles appears to be healing very well, no redness or drainage. Very minimal sockline edema.   Posture Normal   Balance/ Proprioception (Single Leg Stance) Able to perform 30 seconds L for SLS and full tandem   Left PF Strength 4+/5   Ankle/Foot Flexibility Comments some general stiffness into eversion   Accessory Motion/Joint Mobility anterior and posterior glides WNL L   Planned Therapy Interventions   Planned Therapy Interventions balance training;gait training;joint mobilization;manual therapy;neuromuscular re-education;strengthening;stretching;ROM   Planned Modality Interventions   Planned Modality Interventions Biofeedback;Electrical stimulation;Iontophoresis;Traction;TENS;Ultrasound   Clinical Impression   Criteria for Skilled Therapeutic Interventions Met yes, treatment indicated   PT Diagnosis Post op left ankle pain and weakness   Influenced by the following impairments Reduced AROM, impaired L ankle strength/stability, gait impairments, weakness of L LE muscle groups   Functional limitations due to impairments Walking, exercising, work duties   Clinical Presentation Stable/Uncomplicated   Clinical Presentation Rationale Symptoms are stable   Clinical Decision Making (Complexity) Low complexity   Therapy Frequency 2 times/Week   Predicted Duration of Therapy Intervention (days/wks) 2x/week for 2 weeks, then 1x/week for 6 weeks for 10 weeks total   Risk & Benefits of therapy have been explained Yes   Patient, Family & other staff in agreement with plan of care Yes   Clinical Impression Comments Pt is s/p left posterior heel exostectomy, left achilles tendon debridement on 1/19/23. Pt is progressing appropriately since surgery, demonstrating above impairments in L foot/ankle pain, weakness, ROM and gait. Pt would benefit from skilled PT to improve impairments and functional limitations to  return to PLOF. Prognosis is good due to high motivation to improve and limited medical comorbidities.   Education Assessment   Preferred Learning Style Listening;Reading;Pictures/video;Demonstration   Barriers to Learning No barriers   ORTHO GOALS   PT Ortho Eval Goals 1;2;3;4   Ortho Goal 1   Goal Identifier STG   Goal Description Pt will demonstrate ability to independently ambulate 1 mile over even surfaces without limitation due to L foot in order to return to PLOF at work.   Target Date 04/12/23   Ortho Goal 2   Goal Identifier STG   Goal Description Pt will demonstrate ability to independently ambulate 1 mile over uneven surfaces without limitation due to foot in order to improve community ambulation.   Target Date 04/12/23   Ortho Goal 3   Goal Identifier LTG   Goal Description Pt will demonstrate ability to perform single jump with double leg landing without L foot pain in order to safely return to recreational exercise without limitation.   Target Date 05/24/23   Ortho Goal 4   Goal Identifier LTG   Goal Description Pt will be independent wt HEP for self managment of symptoms.   Target Date 05/24/23   Total Evaluation Time   PT Tierra, Low Complexity Minutes (12650) 15     Please contact me with any questions or concerns.   Thank you for this referral.     Tena Pierre, PT, DPT

## 2023-03-22 ENCOUNTER — HOSPITAL ENCOUNTER (OUTPATIENT)
Dept: PHYSICAL THERAPY | Facility: CLINIC | Age: 63
Setting detail: THERAPIES SERIES
Discharge: HOME | End: 2023-03-22
Attending: PODIATRIST
Payer: COMMERCIAL

## 2023-03-22 PROCEDURE — 97110 THERAPEUTIC EXERCISES: CPT | Mod: GP

## 2023-03-22 PROCEDURE — 97116 GAIT TRAINING THERAPY: CPT | Mod: GP

## 2023-03-29 ENCOUNTER — HOSPITAL ENCOUNTER (OUTPATIENT)
Dept: PHYSICAL THERAPY | Facility: CLINIC | Age: 63
Setting detail: THERAPIES SERIES
Discharge: HOME | End: 2023-03-29
Attending: PODIATRIST
Payer: COMMERCIAL

## 2023-03-29 PROCEDURE — 97110 THERAPEUTIC EXERCISES: CPT | Mod: GP

## 2023-04-05 ENCOUNTER — HOSPITAL ENCOUNTER (OUTPATIENT)
Dept: PHYSICAL THERAPY | Facility: CLINIC | Age: 63
Setting detail: THERAPIES SERIES
Discharge: HOME | End: 2023-04-05
Attending: PODIATRIST
Payer: COMMERCIAL

## 2023-04-05 PROCEDURE — 97110 THERAPEUTIC EXERCISES: CPT | Mod: GP

## 2023-04-05 PROCEDURE — 97112 NEUROMUSCULAR REEDUCATION: CPT | Mod: GP

## 2023-04-12 ENCOUNTER — HOSPITAL ENCOUNTER (OUTPATIENT)
Dept: PHYSICAL THERAPY | Facility: CLINIC | Age: 63
Setting detail: THERAPIES SERIES
Discharge: HOME | End: 2023-04-12
Attending: PODIATRIST
Payer: COMMERCIAL

## 2023-04-12 PROCEDURE — 97112 NEUROMUSCULAR REEDUCATION: CPT | Mod: GP

## 2023-04-12 PROCEDURE — 97110 THERAPEUTIC EXERCISES: CPT | Mod: GP

## 2023-04-20 ENCOUNTER — HOSPITAL ENCOUNTER (OUTPATIENT)
Dept: PHYSICAL THERAPY | Facility: CLINIC | Age: 63
Setting detail: THERAPIES SERIES
Discharge: HOME | End: 2023-04-20
Attending: PODIATRIST
Payer: COMMERCIAL

## 2023-04-20 PROCEDURE — 97112 NEUROMUSCULAR REEDUCATION: CPT | Mod: GP

## 2023-04-20 PROCEDURE — 97110 THERAPEUTIC EXERCISES: CPT | Mod: GP

## 2023-04-27 ENCOUNTER — HOSPITAL ENCOUNTER (OUTPATIENT)
Dept: PHYSICAL THERAPY | Facility: CLINIC | Age: 63
Setting detail: THERAPIES SERIES
Discharge: HOME | End: 2023-04-27
Attending: PODIATRIST
Payer: COMMERCIAL

## 2023-04-27 PROCEDURE — 97112 NEUROMUSCULAR REEDUCATION: CPT | Mod: GP

## 2023-04-27 PROCEDURE — 97110 THERAPEUTIC EXERCISES: CPT | Mod: GP

## 2023-05-04 ENCOUNTER — HOSPITAL ENCOUNTER (OUTPATIENT)
Dept: PHYSICAL THERAPY | Facility: CLINIC | Age: 63
Setting detail: THERAPIES SERIES
Discharge: HOME | End: 2023-05-04
Attending: PODIATRIST
Payer: COMMERCIAL

## 2023-05-04 PROCEDURE — 97112 NEUROMUSCULAR REEDUCATION: CPT | Mod: GP

## 2023-05-04 PROCEDURE — 97110 THERAPEUTIC EXERCISES: CPT | Mod: GP

## 2023-05-10 ENCOUNTER — HOSPITAL ENCOUNTER (OUTPATIENT)
Dept: PHYSICAL THERAPY | Facility: CLINIC | Age: 63
Setting detail: THERAPIES SERIES
Discharge: HOME | End: 2023-05-10
Attending: PODIATRIST
Payer: COMMERCIAL

## 2023-05-10 PROCEDURE — 97110 THERAPEUTIC EXERCISES: CPT | Mod: GP

## 2023-05-10 PROCEDURE — 97112 NEUROMUSCULAR REEDUCATION: CPT | Mod: GP

## 2023-05-10 NOTE — PROGRESS NOTES
[facility]    Outpatient Physical Therapy Discharge Note  Patient: Thais Castellon  : 1960    Beginning/End Dates of Reporting Period:  3/15/23 to 5/10/23    Referring Provider: Shivam Andrade DPM    Therapy Diagnosis: Post op left ankle pain and weakness     Client Self Report: Pt is now 17 weeks post op. Reports she hiked 2.2 miles over uneven surfaces and up/down a lot of stairs. The L ankle did not bother her at all during the hiking, was a little sore afterward but overall things are going great. Swelling has gotten progressively better but she does have minimal pain at the insertion point of the achilles where the incision is. Still modifying for burpees/mountain climbers to not do jumping.    Objective Measurements:  Objective Measure: Gait  Details: No noticeable deviations today    Objective Measure: Pain  Details: 0/10 today    Objective Measure: L ankle AROM  Details: PF 60*, DF 14*, inv 49*, juan 30*    Objective Measure: L ankle strength  Details: 5/ all directions    Objective Measure: Palpation  Details: No TTP, some scar tissue over distal achilles    Goals:  Goal Identifier STG   Goal Description Pt will demonstrate ability to independently ambulate 1 mile over even surfaces without limitation due to L foot in order to return to PLOF at work.   Target Date 23   Date Met  23   Progress (detail required for progress note): MET     Goal Identifier STG   Goal Description Pt will demonstrate ability to independently ambulate 1 mile over uneven surfaces without limitation due to foot in order to improve community ambulation.   Target Date 23   Date Met  23   Progress (detail required for progress note): MET     Goal Identifier LTG   Goal Description Pt will demonstrate ability to perform single jump with double leg landing without L foot pain in order to safely return to  recreational exercise without limitation.   Target Date 05/24/23   Date Met  05/10/23   Progress (detail required for progress note): MET     Goal Identifier LTG   Goal Description Pt will be independent [facility] HEP for self managment of symptoms.   Target Date 05/24/23   Date Met  05/10/23   Progress (detail required for progress note): MET     Plan:  Discharge from therapy.    Discharge:    Reason for Discharge: Patient has met all goals.    Discharge Plan: Patient to continue home program.    Please contact me with any questions or concerns.   Thank you for this referral.     Tena Pierre, PT, DPT

## 2024-04-03 ENCOUNTER — TRANSFERRED RECORDS (OUTPATIENT)
Dept: HEALTH INFORMATION MANAGEMENT | Facility: CLINIC | Age: 64
End: 2024-04-03
Payer: COMMERCIAL

## 2024-05-15 ENCOUNTER — TRANSFERRED RECORDS (OUTPATIENT)
Dept: HEALTH INFORMATION MANAGEMENT | Facility: CLINIC | Age: 64
End: 2024-05-15
Payer: COMMERCIAL

## 2024-06-06 ENCOUNTER — HOSPITAL ENCOUNTER (OUTPATIENT)
Dept: MRI IMAGING | Facility: CLINIC | Age: 64
Discharge: HOME | End: 2024-06-06
Payer: COMMERCIAL

## 2024-06-06 DIAGNOSIS — M54.9 BACK PAIN: ICD-10-CM

## 2024-06-06 PROCEDURE — 72148 MRI LUMBAR SPINE W/O DYE: CPT

## 2024-06-12 ENCOUNTER — TRANSFERRED RECORDS (OUTPATIENT)
Dept: HEALTH INFORMATION MANAGEMENT | Facility: CLINIC | Age: 64
End: 2024-06-12
Payer: COMMERCIAL

## 2024-07-10 ENCOUNTER — TRANSFERRED RECORDS (OUTPATIENT)
Dept: HEALTH INFORMATION MANAGEMENT | Facility: CLINIC | Age: 64
End: 2024-07-10
Payer: COMMERCIAL

## 2024-07-22 ENCOUNTER — ORDERS ONLY (AUTO-RELEASED) (OUTPATIENT)
Dept: FAMILY MEDICINE | Facility: CLINIC | Age: 64
End: 2024-07-22

## 2024-07-22 ENCOUNTER — OFFICE VISIT (OUTPATIENT)
Dept: FAMILY MEDICINE | Facility: CLINIC | Age: 64
End: 2024-07-22
Payer: COMMERCIAL

## 2024-07-22 VITALS
SYSTOLIC BLOOD PRESSURE: 110 MMHG | RESPIRATION RATE: 16 BRPM | WEIGHT: 139 LBS | BODY MASS INDEX: 21.07 KG/M2 | OXYGEN SATURATION: 96 % | HEIGHT: 68 IN | HEART RATE: 86 BPM | TEMPERATURE: 98 F | DIASTOLIC BLOOD PRESSURE: 70 MMHG

## 2024-07-22 DIAGNOSIS — Z13.220 SCREENING FOR HYPERLIPIDEMIA: ICD-10-CM

## 2024-07-22 DIAGNOSIS — Z12.11 SCREEN FOR COLON CANCER: ICD-10-CM

## 2024-07-22 DIAGNOSIS — Z13.1 SCREENING FOR DIABETES MELLITUS: ICD-10-CM

## 2024-07-22 DIAGNOSIS — M48.00 SPINAL STENOSIS, MULTIPLE SITES IN SPINE: ICD-10-CM

## 2024-07-22 DIAGNOSIS — Z01.818 PREOP GENERAL PHYSICAL EXAM: Primary | ICD-10-CM

## 2024-07-22 DIAGNOSIS — M51.16 RADICULOPATHY DUE TO LUMBAR INTERVERTEBRAL DISC DISORDER: ICD-10-CM

## 2024-07-22 DIAGNOSIS — Z12.31 VISIT FOR SCREENING MAMMOGRAM: ICD-10-CM

## 2024-07-22 LAB
ALBUMIN UR-MCNC: NEGATIVE MG/DL
ANION GAP SERPL CALCULATED.3IONS-SCNC: 9 MMOL/L (ref 7–15)
APPEARANCE UR: CLEAR
BILIRUB UR QL STRIP: NEGATIVE
BUN SERPL-MCNC: 18.5 MG/DL (ref 8–23)
CALCIUM SERPL-MCNC: 9.5 MG/DL (ref 8.8–10.4)
CHLORIDE SERPL-SCNC: 101 MMOL/L (ref 98–107)
CHOLEST SERPL-MCNC: 185 MG/DL
COLOR UR AUTO: YELLOW
CREAT SERPL-MCNC: 0.71 MG/DL (ref 0.51–0.95)
EGFRCR SERPLBLD CKD-EPI 2021: >90 ML/MIN/1.73M2
ERYTHROCYTE [DISTWIDTH] IN BLOOD BY AUTOMATED COUNT: 11.7 % (ref 10–15)
FASTING STATUS PATIENT QL REPORTED: NO
FASTING STATUS PATIENT QL REPORTED: NO
GLUCOSE SERPL-MCNC: 102 MG/DL (ref 70–99)
GLUCOSE UR STRIP-MCNC: NEGATIVE MG/DL
HCO3 SERPL-SCNC: 28 MMOL/L (ref 22–29)
HCT VFR BLD AUTO: 39.6 % (ref 35–47)
HDLC SERPL-MCNC: 82 MG/DL
HGB BLD-MCNC: 13.5 G/DL (ref 11.7–15.7)
HGB UR QL STRIP: NEGATIVE
KETONES UR STRIP-MCNC: NEGATIVE MG/DL
LDLC SERPL CALC-MCNC: 88 MG/DL
LEUKOCYTE ESTERASE UR QL STRIP: ABNORMAL
MCH RBC QN AUTO: 32.8 PG (ref 26.5–33)
MCHC RBC AUTO-ENTMCNC: 34.1 G/DL (ref 31.5–36.5)
MCV RBC AUTO: 96 FL (ref 78–100)
NITRATE UR QL: NEGATIVE
NONHDLC SERPL-MCNC: 103 MG/DL
PH UR STRIP: 6.5 [PH] (ref 5–7)
PLATELET # BLD AUTO: 175 10E3/UL (ref 150–450)
POTASSIUM SERPL-SCNC: 4.1 MMOL/L (ref 3.4–5.3)
RBC # BLD AUTO: 4.12 10E6/UL (ref 3.8–5.2)
RBC #/AREA URNS AUTO: ABNORMAL /HPF
SODIUM SERPL-SCNC: 138 MMOL/L (ref 135–145)
SP GR UR STRIP: 1.01 (ref 1–1.03)
SQUAMOUS #/AREA URNS AUTO: ABNORMAL /LPF
TRIGL SERPL-MCNC: 76 MG/DL
UROBILINOGEN UR STRIP-ACNC: 0.2 E.U./DL
WBC # BLD AUTO: 4.3 10E3/UL (ref 4–11)
WBC #/AREA URNS AUTO: ABNORMAL /HPF

## 2024-07-22 PROCEDURE — 80061 LIPID PANEL: CPT | Performed by: NURSE PRACTITIONER

## 2024-07-22 PROCEDURE — 81001 URINALYSIS AUTO W/SCOPE: CPT | Performed by: NURSE PRACTITIONER

## 2024-07-22 PROCEDURE — 36415 COLL VENOUS BLD VENIPUNCTURE: CPT | Performed by: NURSE PRACTITIONER

## 2024-07-22 PROCEDURE — 85027 COMPLETE CBC AUTOMATED: CPT | Performed by: NURSE PRACTITIONER

## 2024-07-22 PROCEDURE — 80048 BASIC METABOLIC PNL TOTAL CA: CPT | Performed by: NURSE PRACTITIONER

## 2024-07-22 PROCEDURE — 99214 OFFICE O/P EST MOD 30 MIN: CPT | Performed by: NURSE PRACTITIONER

## 2024-07-22 RX ORDER — PREGABALIN 75 MG/1
75 CAPSULE ORAL 2 TIMES DAILY
COMMUNITY
Start: 2024-07-16

## 2024-07-22 ASSESSMENT — PAIN SCALES - GENERAL: PAINLEVEL: SEVERE PAIN (6)

## 2024-07-22 NOTE — PATIENT INSTRUCTIONS
How to Take Your Medication Before Surgery  Preoperative Medication Instructions   Antiplatelet or Anticoagulation Medication Instructions   - aspirin: Discontinue aspirin 7-10 days prior to procedure to reduce bleeding risk. It should be resumed postoperatively.     Additional Medication Instructions   - pregabalin, gabapentin: Continue without modification.   - ibuprofen (Advil, Motrin): DO NOT TAKE 1 day before surgery.        Patient Education   Preparing for Your Surgery  Getting started  A nurse will call you to review your health history and instructions. They will give you an arrival time based on your scheduled surgery time. Please be ready to share:  Your doctor's clinic name and phone number  Your medical, surgical, and anesthesia history  A list of allergies and sensitivities  A list of medicines, including herbal treatments and over-the-counter drugs  Whether the patient has a legal guardian (ask how to send us the papers in advance)  Please tell us if you're pregnant--or if there's any chance you might be pregnant. Some surgeries may injure a fetus (unborn baby), so they require a pregnancy test. Surgeries that are safe for a fetus don't always need a test, and you can choose whether to have one.   If you have a child who's having surgery, please ask for a copy of Preparing for Your Child's Surgery.    Preparing for surgery  Within 10 to 30 days of surgery: Have a pre-op exam (sometimes called an H&P, or History and Physical). This can be done at a clinic or pre-operative center.  If you're having a , you may not need this exam. Talk to your care team.  At your pre-op exam, talk to your care team about all medicines you take. If you need to stop any medicines before surgery, ask when to start taking them again.  We do this for your safety. Many medicines can make you bleed too much during surgery. Some change how well surgery (anesthesia) drugs work.  Call your insurance company to let them  know you're having surgery. (If you don't have insurance, call [phone].)  Call your clinic if there's any change in your health. This includes signs of a cold or flu (sore throat, runny nose, cough, rash, fever). It also includes a scrape or scratch near the surgery site.  If you have questions on the day of surgery, call your hospital or surgery center.  Eating and drinking guidelines  For your safety: Unless your surgeon tells you otherwise, follow the guidelines below.  Eat and drink as usual until 8 hours before you arrive for surgery. After that, no food or milk.  Drink clear liquids until 2 hours before you arrive. These are liquids you can see through, like water, Gatorade, and Propel Water. They also include plain black coffee and tea (no cream or milk), candy, and breath mints. You can spit out gum when you arrive.  If you drink alcohol: Stop drinking it the night before surgery.  If your care team tells you to take medicine on the morning of surgery, it's okay to take it with a sip of water.  Preventing infection  Shower or bathe the night before and morning of your surgery. Follow the instructions your clinic gave you. (If no instructions, use regular soap.)  Don't shave or clip hair near your surgery site. We'll remove the hair if needed.  Don't smoke or vape the morning of surgery. You may chew nicotine gum up to 2 hours before surgery. A nicotine patch is okay.  Note: Some surgeries require you to completely quit smoking and nicotine. Check with your surgeon.  Your care team will make every effort to keep you safe from infection. We will:  Clean our hands often with soap and water (or an alcohol-based hand rub).  Clean the skin at your surgery site with a special soap that kills germs.  Give you a special gown to keep you warm. (Cold raises the risk of infection.)  Wear special hair covers, masks, gowns and gloves during surgery.  Give antibiotic medicine, if prescribed. Not all surgeries need  antibiotics.  What to bring on the day of surgery  Photo ID and insurance card  Copy of your health care directive, if you have one  Glasses and hearing aids (bring cases)  You can't wear contacts during surgery  Inhaler and eye drops, if you use them (tell us about these when you arrive)  CPAP machine or breathing device, if you use them  A few personal items, if spending the night  If you have . . .  A pacemaker, ICD (cardiac defibrillator) or other implant: Bring the ID card.  An implanted stimulator: Bring the remote control.  A legal guardian: Bring a copy of the certified (court-stamped) guardianship papers.  Please remove any jewelry, including body piercings. Leave jewelry and other valuables at home.  If you're going home the day of surgery  You must have a responsible adult drive you home. They should stay with you overnight as well.  If you don't have someone to stay with you, and you aren't safe to go home alone, we may keep you overnight. Insurance often won't pay for this.  After surgery  If it's hard to control your pain or you need more pain medicine, please call your surgeon's office.  Questions?   If you have any questions for your care team, list them here: _________________________________________________________________________________________________________________________________________________________________________ ____________________________________ ____________________________________ ____________________________________  For informational purposes only. Not to replace the advice of your health care provider. Copyright   2003, 2019 [facility]. All rights reserved. Clinically reviewed by Ling Medeiros MD. [employer] [id number] - REV 12/22.

## 2024-07-22 NOTE — LETTER
July 22, 2024      Thais Castellon  [address] [place] [address]        Dear Ms.Cande,    We are writing to inform you of your test results.    Your test results fall within the expected range(s) or remain unchanged from previous results.  Please continue with current treatment plan.    Resulted Orders   UA Macroscopic with reflex to Microscopic and Culture - Clinic Collect   Result Value Ref Range    Color Urine Yellow Colorless, Straw, Light Yellow, Yellow    Appearance Urine Clear Clear    Glucose Urine Negative Negative mg/dL    Bilirubin Urine Negative Negative    Ketones Urine Negative Negative mg/dL    Specific Gravity Urine 1.010 1.003 - 1.035    Blood Urine Negative Negative    pH Urine 6.5 5.0 - 7.0    Protein Albumin Urine Negative Negative mg/dL    Urobilinogen Urine 0.2 0.2, 1.0 E.U./dL    Nitrite Urine Negative Negative    Leukocyte Esterase Urine Trace (A) Negative   Lipid panel reflex to direct LDL Non-fasting   Result Value Ref Range    Cholesterol 185 <200 mg/dL    Triglycerides 76 <150 mg/dL    Direct Measure HDL 82 >=50 mg/dL    LDL Cholesterol Calculated 88 <=100 mg/dL    Non HDL Cholesterol 103 <130 mg/dL    Patient Fasting > 8hrs? No     Narrative    Cholesterol  Desirable:  <200 mg/dL    Triglycerides  Normal:  Less than 150 mg/dL  Borderline High:  150-199 mg/dL  High:  200-499 mg/dL  Very High:  Greater than or equal to 500 mg/dL    Direct Measure HDL  Female:  Greater than or equal to 50 mg/dL   Male:  Greater than or equal to 40 mg/dL    LDL Cholesterol  Desirable:  <100mg/dL  Above Desirable:  100-129 mg/dL   Borderline High:  130-159 mg/dL   High:  160-189 mg/dL   Very High:  >= 190 mg/dL    Non HDL Cholesterol  Desirable:  130 mg/dL  Above Desirable:  130-159 mg/dL  Borderline High:  160-189 mg/dL  High:  190-219 mg/dL  Very High:  Greater than or equal to 220 mg/dL   CBC with platelets   Result Value Ref Range    WBC Count 4.3 4.0 - 11.0 10e3/uL    RBC Count 4.12 3.80 -  5.20 10e6/uL    Hemoglobin 13.5 11.7 - 15.7 g/dL    Hematocrit 39.6 35.0 - 47.0 %    MCV 96 78 - 100 fL    MCH 32.8 26.5 - 33.0 pg    MCHC 34.1 31.5 - 36.5 g/dL    RDW 11.7 10.0 - 15.0 %    Platelet Count 175 150 - 450 10e3/uL   Basic metabolic panel  (Ca, Cl, CO2, Creat, Gluc, K, Na, BUN)   Result Value Ref Range    Sodium 138 135 - 145 mmol/L    Potassium 4.1 3.4 - 5.3 mmol/L    Chloride 101 98 - 107 mmol/L    Carbon Dioxide (CO2) 28 22 - 29 mmol/L    Anion Gap 9 7 - 15 mmol/L    Urea Nitrogen 18.5 8.0 - 23.0 mg/dL    Creatinine 0.71 0.51 - 0.95 mg/dL    GFR Estimate >90 >60 mL/min/1.73m2      Comment:      eGFR calculated using 2021 CKD-EPI equation.    Calcium 9.5 8.8 - 10.4 mg/dL      Comment:      Reference intervals for this test were updated on 7/16/2024 to reflect our healthy population more accurately. There may be differences in the flagging of prior results with similar values performed with this method. Those prior results can be interpreted in the context of the updated reference intervals.    Glucose 102 (H) 70 - 99 mg/dL    Patient Fasting > 8hrs? No    UA Microscopic with Reflex to Culture   Result Value Ref Range    RBC Urine None Seen 0-2 /HPF /HPF    WBC Urine 0-5 0-5 /HPF /HPF    Squamous Epithelials Urine Few (A) None Seen /LPF    Narrative    Urine Culture not indicated       If you have any questions or concerns, please call the clinic at the number listed above.       Sincerely,      Reema Almodovar, APRN CNP

## 2024-07-22 NOTE — PROGRESS NOTES
Preoperative Evaluation  [facility]  [address]  [facility] [address]  Phone: [phone]  Primary Provider: Karrie Infante MD  Pre-op Performing Provider: KELLIE Hu CNP  Jul 22, 2024 7/22/2024   Surgical Information   What procedure is being done? decompression of L3/4 L45 with bone spur removel   Facility or Hospital where procedure/surgery will be performed: [facility]   Who is doing the procedure / surgery? Dr Sean Pagan   Date of surgery / procedure: 7/31/24   Time of surgery / procedure: 800   Where do you plan to recover after surgery? at home with family      Fax number for surgical facility: [phone]    Assessment & Plan     The proposed surgical procedure is considered INTERMEDIATE risk.    Preop general physical exam  Spinal stenosis, multiple sites in spine  Radiculopathy due to lumbar intervertebral disc disorder  Preoperative exam completed today for upcoming surgical procedure on lumbar spine.  Labs to be completed as below. She does not have any significant cardiac history.  Okay to continue with her pregabalin.  Okay to proceed with procedure.  - CBC with platelets; Future  - UA Macroscopic with reflex to Microscopic and Culture - Clinic Collect  - CBC with platelets  - Basic metabolic panel  (Ca, Cl, CO2, Creat, Gluc, K, Na, BUN)    Screen for colon cancer  - COLOGUARD([employer]); Future    Visit for screening mammogram  - MA Screening Bilateral w/ Cyrus; Future    Screening for hyperlipidemia  - Lipid panel reflex to direct LDL Non-fasting; Future  - Lipid panel reflex to direct LDL Non-fasting    Screening for diabetes mellitus  - Basic metabolic panel  (Ca, Cl, CO2, Creat, Gluc, K, Na, BUN); Future  - Basic metabolic panel  (Ca, Cl, CO2, Creat, Gluc, K, Na, BUN)            - No identified additional risk factors other than previously addressed    Preoperative Medication  Instructions  Antiplatelet or Anticoagulation Medication Instructions   - aspirin: Discontinue aspirin 7-10 days prior to procedure to reduce bleeding risk. It should be resumed postoperatively.     Additional Medication Instructions   - pregabalin, gabapentin: Continue without modification.   - ibuprofen (Advil, Motrin): DO NOT TAKE 1 day before surgery.     Recommendation  Approval given to proceed with proposed procedure, without further diagnostic evaluation.    Isaac Plascencia is a 64 year old, presenting for the following:  Pre-Op Exam          7/22/2024     9:37 AM   Additional Questions   Roomed by Allegra jay CMA   Accompanied by Self     HPI related to upcoming procedure: chronic low back pain. The pain will radiate into the hip and this is caused from a bone spur. Failed conservative management including at home exercise program.  Has been recommended to have decompression of the L3-4 and L4-5 with removal of bone spur to help reduce the chronic pain.         7/22/2024   Pre-Op Questionnaire   Have you ever had a heart attack or stroke? No   Have you ever had surgery on your heart or blood vessels, such as a stent placement, a coronary artery bypass, or surgery on an artery in your head, neck, heart, or legs? No   Do you have chest pain with activity? No   Do you have a history of heart failure? No   Do you currently have a cold, bronchitis or symptoms of other infection? No   Do you have a cough, shortness of breath, or wheezing? No   Do you or anyone in your family have previous history of blood clots? No   Do you or does anyone in your family have a serious bleeding problem such as prolonged bleeding following surgeries or cuts? No   Have you ever had problems with anemia or been told to take iron pills? No   Have you had any abnormal blood loss such as black, tarry or bloody stools, or abnormal vaginal bleeding? No   Have you ever had a blood transfusion? No   Are you willing to have a blood transfusion  if it is medically needed before, during, or after your surgery? Yes   Have you or any of your relatives ever had problems with anesthesia? No   Do you have sleep apnea, excessive snoring or daytime drowsiness? No   Do you have any artifical heart valves or other implanted medical devices like a pacemaker, defibrillator, or continuous glucose monitor? No   Do you have artificial joints? (!) YES- left knee   Are you allergic to latex? No      Health Care Directive  Patient does not have a Health Care Directive or Living Will: Discussed advance care planning with patient; information given to patient to review.    Preoperative Review of    reviewed - no record of controlled substances prescribed.      Status of Chronic Conditions:  See problem list for active medical problems.  Problems all longstanding and stable, except as noted/documented.  See ROS for pertinent symptoms related to these conditions.    Patient Active Problem List    Diagnosis Date Noted    Sprain of MCL (medial collateral ligament) of knee 07/07/2014     Priority: Medium     6/14 after TKA. Conservative care      Status post total knee replacement 04/08/2014     Priority: Medium    Kyphosis of thoracolumbar region 01/20/2012     Priority: Medium    L Lumbosacral neuritis 01/20/2012     Priority: Medium     Failed gabapentin - tachycardia      Menopausal syndrome (hot flashes) 01/20/2012     Priority: Medium    Insomnia 01/20/2012     Priority: Medium    CARDIOVASCULAR SCREENING; LDL GOAL LESS THAN 160 10/31/2010     Priority: Medium    Left knee DJD 12/04/2009     Priority: Medium    Insomnia 06/30/2006     Priority: Medium     Problem list name updated by automated process. 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 Provider to review        Past Medical History:   Diagnosis Date    Anemia, unspecified     resolved .w hyst     Dysplasia of cervix, unspecified 2004    ASMITA III on cervical path with hyst    Leiomyoma of uterus, unspecified     Hyst 2004    Ulnar collateral ligament sprain(841.1)     Ulnar collateral ligament    Unspecified polyarthropathy or polyarthritis, multiple sites     Parvovirus infection 3/05     Past Surgical History:   Procedure Laterality Date    ARTHROPLASTY KNEE  4/7/2014    Procedure: ARTHROPLASTY KNEE;  Left Total Knee Arthroplasty;  Surgeon: Kaiden Arteaga MD;  Location: [facility] OR    ARTHROSCOPY KNEE RT/LT  age 11    Left knee    D & C  x 3    Missed Ab's    EXCISE EXOSTOSIS FOOT Left 1/19/2023    Procedure: LEFT POSTERIOR HEEL EXOSTECTOMY;  Surgeon: Shivam Andrade DPM;  Location: [facility] OR     LAPAROSCOPY, SURGICAL, ABDOMEN, PERITONEUM & OMENTUM; DX W/ OR W/O SPECIMEN(S)  1994    Dx lap for infertility    HYSTERECTOMY, PAP NO LONGER INDICATED      HYSTERECTOMY, VAGINAL  5/2004    LAMINECTOMY LUMBAR ONE LEVEL  2/29/2012    Procedure:LAMINECTOMY LUMBAR ONE LEVEL; Left Lumbar 3-4 Foraminotomy (c-arm); Surgeon:BRYAN HANSEN; Location:[facility] OR    ORTHOPEDIC SURGERY  9/7/2010    open repair left great toe laceration    REPAIR TENDON ACHILLES Left 1/19/2023    Procedure: LEFT ACHILLES TENDON DEBRIDEMENT;  Surgeon: Shivam Andrade DPM;  Location: [facility] OR    SURGICAL HISTORY OF -   2009    Debridement of lateral meniscus, lateral compartment with debridement of the patellofemoral spur superior lateral patella.     SURGICAL HISTORY OF -   2010    Open wound, left great toe, possible laceration of the extensor tendon, left great toe.     ZC REVISE THUMB TENDON  5/2003    Reconstruct ulnar collateral ligament-Right     Current Outpatient Medications   Medication Sig Dispense Refill    pregabalin (LYRICA) 75 MG capsule Take 75 mg by mouth 2 times daily      acetaminophen (TYLENOL) 500 MG tablet Take 500-1,000 mg by mouth every 6 hours as needed for mild pain      aspirin (ASA) 81 MG chewable  "tablet Take 1 tablet (81 mg) by mouth daily (Patient not taking: Reported on 7/22/2024) 35 tablet 0    hydrOXYzine (VISTARIL) 25 MG capsule 1-2 by mouth every 4-6 hours (Patient not taking: Reported on 7/22/2024) 30 capsule 0    ibuprofen (ADVIL/MOTRIN) 200 MG tablet Take 200-400 mg by mouth every 4 hours as needed for pain      oxyCODONE (ROXICODONE) 5 MG tablet 1-2 by mouth every 4-6 hours as needed (Patient not taking: Reported on 7/22/2024) 30 tablet 0       Allergies   Allergen Reactions    Azithromycin Blisters     Water blisters over entire body. Completed Z-pack, tolerated since taking Allegra,Benedryl.    Gabapentin Other (See Comments)     Tachycardia          Social History     Tobacco Use    Smoking status: Never    Smokeless tobacco: Never   Substance Use Topics    Alcohol use: Yes     Comment: 2 drinks per month     Family History   Problem Relation Age of Onset    Arthritis Mother         Rheumatoid    Hypertension Mother     Cancer Mother         basal cell on leg    Respiratory Brother         Asthma    Neurologic Disorder Paternal Grandfather         parkinsons    Respiratory Brother         asthma    Respiratory Son         asthma, allergies    Breast Cancer No family hx of     Cancer - colorectal No family hx of      History   Drug Use No             Review of Systems  Constitutional, neuro, ENT, endocrine, pulmonary, cardiac, gastrointestinal, genitourinary, musculoskeletal, integument and psychiatric systems are negative, except as otherwise noted.    Objective    /70   Pulse 86   Temp 98  F (36.7  C) (Tympanic)   Resp 16   Ht 1.727 m (5' 8\")   Wt 63 kg (139 lb)   SpO2 96%   BMI 21.13 kg/m     Estimated body mass index is 21.13 kg/m  as calculated from the following:    Height as of this encounter: 1.727 m (5' 8\").    Weight as of this encounter: 63 kg (139 lb).  Physical Exam  GENERAL: alert and no distress  EYES: Eyes grossly normal to inspection, PERRL and conjunctivae and " "sclerae normal  HENT: ear canals and TM's normal, nose and mouth without ulcers or lesions  NECK: no adenopathy, no asymmetry, masses, or scars  RESP: lungs clear to auscultation - no rales, rhonchi or wheezes  CV: regular rate and rhythm, normal S1 S2, no S3 or S4, no murmur, click or rub, no peripheral edema  ABDOMEN: soft, nontender, no hepatosplenomegaly, no masses and bowel sounds normal  MS: no gross musculoskeletal defects noted, no edema  SKIN: no suspicious lesions or rashes  NEURO: Normal strength and tone, mentation intact and speech normal  PSYCH: mentation appears normal, affect normal/bright    No results for input(s): \"HGB\", \"PLT\", \"INR\", \"NA\", \"POTASSIUM\", \"CR\", \"A1C\" in the last 8760 hours.     Diagnostics  No labs were ordered during this visit.   No EKG required, no history of coronary heart disease, significant arrhythmia, peripheral arterial disease or other structural heart disease.    Revised Cardiac Risk Index (RCRI)  The patient has the following serious cardiovascular risks for perioperative complications:   - No serious cardiac risks = 0 points     RCRI Interpretation: 0 points: Class I (very low risk - 0.4% complication rate)         Signed Electronically by: KELLIE Hu CNP  Copy of this evaluation report is provided to requesting physician.         "

## 2024-07-29 LAB — NONINV COLON CA DNA+OCC BLD SCRN STL QL: NORMAL

## 2024-08-07 LAB — NONINV COLON CA DNA+OCC BLD SCRN STL QL: NORMAL

## 2024-08-13 ENCOUNTER — TRANSFERRED RECORDS (OUTPATIENT)
Dept: HEALTH INFORMATION MANAGEMENT | Facility: CLINIC | Age: 64
End: 2024-08-13
Payer: COMMERCIAL

## 2024-09-17 LAB — NONINV COLON CA DNA+OCC BLD SCRN STL QL: NEGATIVE

## 2024-10-17 ENCOUNTER — PATIENT OUTREACH (OUTPATIENT)
Dept: FAMILY MEDICINE | Facility: CLINIC | Age: 64
End: 2024-10-17
Payer: COMMERCIAL

## 2024-10-17 NOTE — LETTER
October 17, 2024    Thais Castellon  [address] [place] [place] [address]        Your team at [facility] cares about your health. We have reviewed your chart and based on our findings; we are making the following recommendations to better manage your health.     You are in particular need of attention regarding the following:     PREVENTATIVE VISIT: Annual Medicare Wellness:Schedule an Annual Medicare Wellness Exam. Please call your [facility] clinic to set up your appointment.- Please call [phone] to schedule your appointment.    If you have already completed these items, please contact the clinic via phone or   [employer] so your care team can review and update your records. Thank you for   choosing [facility] for your healthcare needs. For any questions,   concerns, or to schedule an appointment please contact our clinic.    Healthy Regards,      Your [facility] Care Team

## 2024-10-17 NOTE — TELEPHONE ENCOUNTER
Patient Quality Outreach    Patient is due for the following:   Physical Annual Wellness Visit    Next Steps:   Schedule a Annual Wellness Visit    Type of outreach:    Sent letter.    Next Steps:  Reach out within 90 days via Letter.    Max number of attempts reached: Yes. Will try again in 90 days if patient still on fail list.    Questions for provider review:    None           Viridiana Wyatt MA

## 2024-12-18 ENCOUNTER — PATIENT OUTREACH (OUTPATIENT)
Dept: CARE COORDINATION | Facility: CLINIC | Age: 64
End: 2024-12-18
Payer: COMMERCIAL

## 2025-01-15 ENCOUNTER — HOSPITAL ENCOUNTER (OUTPATIENT)
Dept: MAMMOGRAPHY | Facility: CLINIC | Age: 65
Discharge: HOME | End: 2025-01-15
Attending: NURSE PRACTITIONER
Payer: COMMERCIAL

## 2025-01-15 DIAGNOSIS — Z12.31 VISIT FOR SCREENING MAMMOGRAM: ICD-10-CM

## 2025-01-15 PROCEDURE — 77063 BREAST TOMOSYNTHESIS BI: CPT

## 2025-02-24 ENCOUNTER — PATIENT OUTREACH (OUTPATIENT)
Dept: FAMILY MEDICINE | Facility: CLINIC | Age: 65
End: 2025-02-24
Payer: COMMERCIAL

## 2025-02-24 NOTE — LETTER
February 24, 2025    Thais Castellon  [address] [place] [address]        Your team at [facility] cares about your health. We have reviewed your chart and based on our findings; we are making the following recommendations to better manage your health.     You are in particular need of attention regarding the following:     PREVENTATIVE VISIT: Annual Medicare Wellness:Schedule an Annual Medicare Wellness Exam. Please call your [facility] clinic to set up your appointment.    If you have already completed these items, please contact the clinic via phone or   MyChart so your care team can review and update your records. Thank you for   choosing [facility]ics for your healthcare needs. For any questions,   concerns, or to schedule an appointment please contact our clinic.    Healthy Regards,      Your [facility] Care Team

## 2025-02-24 NOTE — TELEPHONE ENCOUNTER
Patient Quality Outreach    Patient is due for the following:   Physical Annual Wellness Visit    Action(s) Taken:   Schedule a Annual Wellness Visit    Type of outreach:    Sent letter.    Questions for provider review:    None           Viridiana Wyatt MA

## 2025-06-10 ENCOUNTER — TELEPHONE (OUTPATIENT)
Dept: FAMILY MEDICINE | Facility: CLINIC | Age: 65
End: 2025-06-10
Payer: COMMERCIAL

## 2025-06-10 NOTE — TELEPHONE ENCOUNTER
Patient Quality Outreach    Patient is due for the following:   Physical Annual Wellness Visit    Action(s) Taken:   Schedule a Annual Wellness Visit    Type of outreach:    Phone, spoke to patient/parent. Patient/parent will call back to schedule.    Questions for provider review:    None         Viridiana Wyatt MA  Chart routed to None.

## (undated) DEVICE — SOL WATER IRRIG 1000ML BOTTLE 2F7114

## (undated) DEVICE — BANDAGE ELASTIC 4X550 LF DBL 593-94LF

## (undated) DEVICE — DRSG ADAPTIC 3X8" 6113

## (undated) DEVICE — SUCTION MANIFOLD NEPTUNE 2 SYS 1 PORT 702-025-000

## (undated) DEVICE — SYR 10ML LL W/O NDL 302995

## (undated) DEVICE — SUTURE VICRYL+ 0 27IN CT-1 UND VCP260H

## (undated) DEVICE — PLATE GROUNDING ADULT W/CORD 9165L

## (undated) DEVICE — SUCTION CANISTER MEDIVAC LINER 3000ML W/LID 65651-530

## (undated) DEVICE — NEEDLE HYPO 25X1 SAFETY 305916

## (undated) DEVICE — NEEDLE HYPO 22X1-1/2 SAFETY 305900

## (undated) DEVICE — DRSG GAUZE 4X4" TRAY 6939

## (undated) DEVICE — DRAPE STERI TOWEL LG 1010

## (undated) DEVICE — CUSTOM PACK LOWER EXTREMITY SOP5BLEHEA

## (undated) DEVICE — SOLIDIFIER FLD 3.2OZ  CL-FREE F/ BIOHZRD WASTE 3000ML CANIST

## (undated) DEVICE — GLOVE BIOGEL PI SZ 7.5 40875

## (undated) DEVICE — SU ETHILON 4-0 PS-2 18" BLACK 1667H

## (undated) DEVICE — POSITIONER ARM CRADLE LAMINECTOMY DISP

## (undated) DEVICE — PREP PAD ALCOHOL 2PLY MED STRL APP102A

## (undated) DEVICE — PREP CHLORAPREP 26ML TINTED HI-LITE ORANGE 930815

## (undated) DEVICE — PREP POVIDONE-IODINE 10% SOLUTION 4OZ BOTTLE MDS093944

## (undated) DEVICE — SOL NACL 0.9% IRRIG 1000ML BOTTLE 2F7124

## (undated) RX ORDER — FENTANYL CITRATE 50 UG/ML
INJECTION, SOLUTION INTRAMUSCULAR; INTRAVENOUS
Status: DISPENSED
Start: 2023-01-19

## (undated) RX ORDER — PROPOFOL 10 MG/ML
INJECTION, EMULSION INTRAVENOUS
Status: DISPENSED
Start: 2023-01-19

## (undated) RX ORDER — LIDOCAINE HYDROCHLORIDE 10 MG/ML
INJECTION, SOLUTION EPIDURAL; INFILTRATION; INTRACAUDAL; PERINEURAL
Status: DISPENSED
Start: 2023-01-19

## (undated) RX ORDER — FENTANYL CITRATE-0.9 % NACL/PF 10 MCG/ML
PLASTIC BAG, INJECTION (ML) INTRAVENOUS
Status: DISPENSED
Start: 2023-01-19

## (undated) RX ORDER — DEXAMETHASONE SODIUM PHOSPHATE 10 MG/ML
INJECTION, EMULSION INTRAMUSCULAR; INTRAVENOUS
Status: DISPENSED
Start: 2023-01-19

## (undated) RX ORDER — ONDANSETRON 2 MG/ML
INJECTION INTRAMUSCULAR; INTRAVENOUS
Status: DISPENSED
Start: 2023-01-19